# Patient Record
Sex: FEMALE | Race: WHITE | NOT HISPANIC OR LATINO | Employment: OTHER | URBAN - METROPOLITAN AREA
[De-identification: names, ages, dates, MRNs, and addresses within clinical notes are randomized per-mention and may not be internally consistent; named-entity substitution may affect disease eponyms.]

---

## 2017-03-09 ENCOUNTER — HOSPITAL ENCOUNTER (OUTPATIENT)
Dept: OTHER | Facility: HOSPITAL | Age: 77
Discharge: HOME | End: 2017-03-09
Attending: INTERNAL MEDICINE | Admitting: INTERNAL MEDICINE

## 2018-01-07 ENCOUNTER — LAB REQUISITION (OUTPATIENT)
Dept: LAB | Facility: HOSPITAL | Age: 78
End: 2018-01-07

## 2018-01-07 DIAGNOSIS — Z00.00 ROUTINE GENERAL MEDICAL EXAMINATION AT A HEALTH CARE FACILITY: ICD-10-CM

## 2018-01-07 LAB
BACTERIA UR QL AUTO: ABNORMAL /HPF
BILIRUB UR QL STRIP: NEGATIVE
CLARITY UR: ABNORMAL
COD CRY URNS QL: ABNORMAL /HPF
COLOR UR: ABNORMAL
GLUCOSE UR STRIP-MCNC: NEGATIVE MG/DL
HGB UR QL STRIP.AUTO: ABNORMAL
HYALINE CASTS UR QL AUTO: ABNORMAL /LPF
KETONES UR QL STRIP: NEGATIVE
LEUKOCYTE ESTERASE UR QL STRIP.AUTO: ABNORMAL
NITRITE UR QL STRIP: NEGATIVE
PH UR STRIP.AUTO: 6 [PH] (ref 5–8)
PROT UR QL STRIP: ABNORMAL
RBC # UR: ABNORMAL /HPF
REF LAB TEST METHOD: ABNORMAL
SP GR UR STRIP: 1.01 (ref 1–1.03)
SQUAMOUS #/AREA URNS HPF: ABNORMAL /HPF
UROBILINOGEN UR QL STRIP: ABNORMAL
WBC UR QL AUTO: ABNORMAL /HPF

## 2018-01-07 PROCEDURE — 81001 URINALYSIS AUTO W/SCOPE: CPT

## 2018-05-17 ENCOUNTER — HOSPITAL ENCOUNTER (OUTPATIENT)
Dept: GENERAL RADIOLOGY | Facility: HOSPITAL | Age: 78
Discharge: HOME | End: 2018-05-17
Attending: ORTHOPAEDIC SURGERY | Admitting: ORTHOPAEDIC SURGERY

## 2019-08-29 ENCOUNTER — OFFICE (OUTPATIENT)
Dept: URBAN - METROPOLITAN AREA CLINIC 64 | Facility: CLINIC | Age: 79
End: 2019-08-29

## 2019-08-29 ENCOUNTER — APPOINTMENT (OUTPATIENT)
Dept: GENERAL RADIOLOGY | Facility: HOSPITAL | Age: 79
End: 2019-08-29

## 2019-08-29 VITALS
WEIGHT: 170 LBS | HEIGHT: 63 IN | SYSTOLIC BLOOD PRESSURE: 138 MMHG | DIASTOLIC BLOOD PRESSURE: 52 MMHG | HEART RATE: 78 BPM

## 2019-08-29 DIAGNOSIS — K64.8 OTHER HEMORRHOIDS: ICD-10-CM

## 2019-08-29 DIAGNOSIS — K64.4 RESIDUAL HEMORRHOIDAL SKIN TAGS: ICD-10-CM

## 2019-08-29 DIAGNOSIS — K62.5 HEMORRHAGE OF ANUS AND RECTUM: ICD-10-CM

## 2019-08-29 DIAGNOSIS — K59.00 CONSTIPATION, UNSPECIFIED: ICD-10-CM

## 2019-08-29 PROCEDURE — 99203 OFFICE O/P NEW LOW 30 MIN: CPT | Performed by: NURSE PRACTITIONER

## 2019-08-29 RX ORDER — HYDROCORTISONE ACETATE 25 MG/1
25 SUPPOSITORY RECTAL
Qty: 14 | Refills: 1 | Status: COMPLETED
Start: 2019-08-29 | End: 2021-04-07

## 2019-08-29 RX ORDER — HYDROCORTISONE 25 MG/G
5 CREAM TOPICAL
Qty: 1 | Refills: 1 | Status: COMPLETED
Start: 2019-08-29 | End: 2021-04-07

## 2019-10-31 ENCOUNTER — OFFICE (OUTPATIENT)
Dept: URBAN - METROPOLITAN AREA CLINIC 64 | Facility: CLINIC | Age: 79
End: 2019-10-31

## 2019-10-31 VITALS
DIASTOLIC BLOOD PRESSURE: 85 MMHG | WEIGHT: 174 LBS | HEIGHT: 63 IN | HEART RATE: 68 BPM | SYSTOLIC BLOOD PRESSURE: 175 MMHG

## 2019-10-31 DIAGNOSIS — K59.00 CONSTIPATION, UNSPECIFIED: ICD-10-CM

## 2019-10-31 DIAGNOSIS — K62.5 HEMORRHAGE OF ANUS AND RECTUM: ICD-10-CM

## 2019-10-31 PROCEDURE — 99213 OFFICE O/P EST LOW 20 MIN: CPT | Performed by: NURSE PRACTITIONER

## 2020-02-04 ENCOUNTER — OFFICE VISIT (OUTPATIENT)
Dept: FAMILY MEDICINE CLINIC | Facility: CLINIC | Age: 80
End: 2020-02-04

## 2020-02-04 ENCOUNTER — TELEPHONE (OUTPATIENT)
Dept: FAMILY MEDICINE CLINIC | Facility: CLINIC | Age: 80
End: 2020-02-04

## 2020-02-04 VITALS
OXYGEN SATURATION: 96 % | RESPIRATION RATE: 16 BRPM | BODY MASS INDEX: 32.89 KG/M2 | HEIGHT: 63 IN | HEART RATE: 89 BPM | TEMPERATURE: 99 F | WEIGHT: 185.6 LBS

## 2020-02-04 DIAGNOSIS — I10 ESSENTIAL HYPERTENSION: ICD-10-CM

## 2020-02-04 DIAGNOSIS — E78.2 MIXED HYPERLIPIDEMIA: ICD-10-CM

## 2020-02-04 DIAGNOSIS — N18.4 CHRONIC KIDNEY DISEASE, STAGE IV (SEVERE) (HCC): ICD-10-CM

## 2020-02-04 DIAGNOSIS — H61.22 IMPACTED CERUMEN OF LEFT EAR: ICD-10-CM

## 2020-02-04 DIAGNOSIS — E03.9 ACQUIRED HYPOTHYROIDISM: Primary | ICD-10-CM

## 2020-02-04 DIAGNOSIS — S72.009D CLOSED FRACTURE OF NECK OF FEMUR WITH ROUTINE HEALING, UNSPECIFIED LATERALITY, SUBSEQUENT ENCOUNTER: ICD-10-CM

## 2020-02-04 PROBLEM — N18.9 ANEMIA SECONDARY TO RENAL FAILURE: Status: ACTIVE | Noted: 2020-02-04

## 2020-02-04 PROBLEM — G93.41 METABOLIC ENCEPHALOPATHY: Status: ACTIVE | Noted: 2018-07-06

## 2020-02-04 PROBLEM — IMO0002 DEGENERATION OF INTERVERTEBRAL DISC: Status: ACTIVE | Noted: 2020-02-04

## 2020-02-04 PROBLEM — J30.1 HAY FEVER: Status: ACTIVE | Noted: 2020-02-04

## 2020-02-04 PROBLEM — E07.89: Status: ACTIVE | Noted: 2018-07-07

## 2020-02-04 PROBLEM — Z12.31 VISIT FOR SCREENING MAMMOGRAM: Status: ACTIVE | Noted: 2020-02-04

## 2020-02-04 PROBLEM — S72.009A FRACTURE OF NECK OF FEMUR (HCC): Status: ACTIVE | Noted: 2020-02-04

## 2020-02-04 PROBLEM — E78.5 HYPERLIPIDEMIA: Status: ACTIVE | Noted: 2020-02-04

## 2020-02-04 PROBLEM — M19.90 OSTEOARTHRITIS: Status: ACTIVE | Noted: 2020-02-04

## 2020-02-04 PROBLEM — G47.00 INSOMNIA, UNSPECIFIED: Status: ACTIVE | Noted: 2020-02-04

## 2020-02-04 PROBLEM — E66.3 OVERWEIGHT: Status: ACTIVE | Noted: 2020-02-04

## 2020-02-04 PROBLEM — S72.001A CLOSED DISPLACED FRACTURE OF RIGHT FEMORAL NECK (HCC): Status: ACTIVE | Noted: 2017-10-21

## 2020-02-04 PROBLEM — I34.0 MITRAL REGURGITATION: Status: ACTIVE | Noted: 2020-02-04

## 2020-02-04 PROBLEM — D63.1 ANEMIA SECONDARY TO RENAL FAILURE: Status: ACTIVE | Noted: 2020-02-04

## 2020-02-04 PROBLEM — R79.89 T3 LOW IN SERUM: Status: ACTIVE | Noted: 2018-07-07

## 2020-02-04 PROCEDURE — 99203 OFFICE O/P NEW LOW 30 MIN: CPT | Performed by: FAMILY MEDICINE

## 2020-02-04 PROCEDURE — 69209 REMOVE IMPACTED EAR WAX UNI: CPT | Performed by: FAMILY MEDICINE

## 2020-02-04 RX ORDER — NEPHROCAP 1 MG
CAP ORAL
COMMUNITY
Start: 2015-04-14

## 2020-02-04 RX ORDER — DILTIAZEM HYDROCHLORIDE 240 MG/1
CAPSULE, COATED, EXTENDED RELEASE ORAL
COMMUNITY
Start: 2012-07-12

## 2020-02-04 RX ORDER — QUETIAPINE FUMARATE 25 MG/1
12.5 TABLET, FILM COATED ORAL
COMMUNITY
Start: 2018-07-19

## 2020-02-04 RX ORDER — SULFAMETHOXAZOLE AND TRIMETHOPRIM 800; 160 MG/1; MG/1
TABLET ORAL
COMMUNITY
Start: 2014-03-19

## 2020-02-04 RX ORDER — NEBIVOLOL 5 MG/1
TABLET ORAL
COMMUNITY
Start: 2011-09-06

## 2020-02-04 RX ORDER — AMIODARONE HYDROCHLORIDE 200 MG/1
TABLET ORAL
COMMUNITY
Start: 2020-01-14

## 2020-02-04 RX ORDER — ACETAMINOPHEN 325 MG/1
TABLET ORAL
COMMUNITY
Start: 2020-01-16

## 2020-02-04 RX ORDER — DIPHENHYDRAMINE HYDROCHLORIDE 25 MG/1
CAPSULE ORAL
COMMUNITY
Start: 2020-01-02

## 2020-02-04 RX ORDER — SERTRALINE HYDROCHLORIDE 25 MG/1
TABLET, FILM COATED ORAL
COMMUNITY
Start: 2013-04-01

## 2020-02-04 RX ORDER — ANTACID TABLETS 500 MG/1
TABLET, CHEWABLE ORAL
COMMUNITY
Start: 2019-12-29

## 2020-02-04 RX ORDER — BUMETANIDE 2 MG/1
TABLET ORAL
COMMUNITY
Start: 2020-01-17

## 2020-02-04 RX ORDER — SUCRALFATE 1 G/1
TABLET ORAL
COMMUNITY
Start: 2014-07-07

## 2020-02-04 RX ORDER — POTASSIUM CHLORIDE 1.5 G/1.77G
POWDER, FOR SOLUTION ORAL
COMMUNITY
Start: 2014-07-07

## 2020-02-04 RX ORDER — POLYETHYLENE GLYCOL 3350 17 G/17G
POWDER ORAL
COMMUNITY
Start: 2019-12-27

## 2020-02-04 RX ORDER — LANOLIN ALCOHOL/MO/W.PET/CERES
CREAM (GRAM) TOPICAL
COMMUNITY
Start: 2020-01-10

## 2020-02-04 RX ORDER — IPRATROPIUM BROMIDE AND ALBUTEROL SULFATE 2.5; .5 MG/3ML; MG/3ML
SOLUTION RESPIRATORY (INHALATION)
COMMUNITY
Start: 2019-11-07

## 2020-02-04 RX ORDER — MIDODRINE HYDROCHLORIDE 10 MG/1
TABLET ORAL
COMMUNITY
Start: 2020-01-31

## 2020-02-04 RX ORDER — PROMETHAZINE HYDROCHLORIDE 25 MG/1
TABLET ORAL
COMMUNITY
Start: 2013-12-27

## 2020-02-04 RX ORDER — HYDRALAZINE HYDROCHLORIDE 50 MG/1
50 TABLET, FILM COATED ORAL
COMMUNITY

## 2020-02-04 RX ORDER — NEBIVOLOL 10 MG/1
TABLET ORAL
COMMUNITY
Start: 2012-07-12

## 2020-02-04 RX ORDER — FOLIC ACID 1 MG/1
TABLET ORAL
COMMUNITY
Start: 2020-01-09

## 2020-02-04 RX ORDER — ASCORBIC ACID 500 MG
TABLET ORAL
COMMUNITY
Start: 2012-07-12

## 2020-02-04 RX ORDER — MULTIVITAMIN WITH IRON
TABLET ORAL
COMMUNITY
Start: 2011-09-07

## 2020-02-04 RX ORDER — RENAGEL 800 MG/1
TABLET ORAL
COMMUNITY
Start: 2019-12-23

## 2020-02-04 RX ORDER — MULTIVITAMIN
CAPSULE ORAL
COMMUNITY
Start: 2013-04-01

## 2020-02-04 RX ORDER — LEVOTHYROXINE SODIUM 0.15 MG/1
TABLET ORAL
COMMUNITY
Start: 2020-01-20

## 2020-02-04 RX ORDER — FERROUS SULFATE 325(65) MG
TABLET ORAL
COMMUNITY
Start: 2012-12-17

## 2020-02-04 RX ORDER — MIDODRINE HYDROCHLORIDE 5 MG/1
TABLET ORAL
COMMUNITY
Start: 2020-01-11

## 2020-02-04 RX ORDER — SEVELAMER CARBONATE 800 MG/1
TABLET, FILM COATED ORAL
COMMUNITY
Start: 2020-01-19

## 2020-02-04 RX ORDER — ALPRAZOLAM 0.5 MG/1
TABLET ORAL
COMMUNITY
Start: 2014-01-20

## 2020-02-04 RX ORDER — MIRTAZAPINE 30 MG/1
TABLET, FILM COATED ORAL
COMMUNITY
Start: 2020-01-25

## 2020-02-04 RX ORDER — HYDRALAZINE HYDROCHLORIDE 25 MG/1
3 TABLET, FILM COATED ORAL
COMMUNITY
Start: 2011-12-08 | End: 2020-05-12 | Stop reason: SDUPTHER

## 2020-02-04 RX ORDER — PANTOPRAZOLE SODIUM 40 MG/1
TABLET, DELAYED RELEASE ORAL
COMMUNITY
Start: 2020-01-05

## 2020-02-04 RX ORDER — ESCITALOPRAM OXALATE 10 MG/1
TABLET ORAL
COMMUNITY
Start: 2020-01-10

## 2020-02-04 RX ORDER — SIMVASTATIN 10 MG
TABLET ORAL
COMMUNITY
Start: 2020-01-09

## 2020-02-04 NOTE — TELEPHONE ENCOUNTER
Called [facility] and rehab and spoke to edison and gave her orders  For cephalexin 500mg tid for 10 days and liquid colace 5 drops each ear nightly with no stop date at this time. Edison did repeat those orders back to me.

## 2020-02-04 NOTE — PROGRESS NOTES
Subjective   Vidhi Bertrand is a 79 y.o. female.     Chief Complaint   Patient presents with   • Hypothyroidism   • Arm Pain   • Fall       Patient is currently in rehab at [facility] due to a fall from 2 years ago which resulted in a broken right shoulder and right leg. She was brought here today by her daughter who did not accompany her into the room.  has an extensive medical history including kidney failure and she gets dyalisis at [facility], hyperlipidemia, hypothyroidism, and anemia. We have requested Mrs.Jobes CASTLE from [facility].    Hyperthyroidism   This is a chronic problem. The current episode started more than 1 year ago. The problem occurs constantly. Associated symptoms include congestion, fatigue, nausea and a visual change. Pertinent negatives include no abdominal pain, chest pain, chills, coughing, diaphoresis, fever, headaches, sore throat or vomiting. Nothing aggravates the symptoms. She has tried nothing for the symptoms. The treatment provided no relief.   Arm Pain    The incident occurred more than 1 week ago. The injury mechanism was a fall. The pain is present in the right elbow, right forearm and right shoulder. The quality of the pain is described as aching. The pain does not radiate. The pain is mild. Pertinent negatives include no chest pain.            I personally reviewed and updated the patient's allergies, medications, problem list, and past medical, surgical, social, and family history.     Family History   Problem Relation Age of Onset   • Stomach cancer Mother    • Stomach cancer Father    • Kidney cancer Sister    • Stroke Brother    • Cerebral aneurysm Brother    • Kidney cancer Sister        Social History     Tobacco Use   • Smoking status: Never Smoker   • Smokeless tobacco: Never Used   Substance Use Topics   • Alcohol use: Not Currently   • Drug use: Not Currently       Past Surgical History:   Procedure Laterality Date   • CARDIAC  CATHETERIZATION  09/2006    Negative, Stavens. Stavens 8/2009   • CARPAL TUNNEL RELEASE Left     LUE   • CEREBRAL ANEURYSM REPAIR  2003   • COLONOSCOPY  09/2007    Diverticulosis, and sm internal hemorrhoid, Saranya. Normal 2002   • REPLACEMENT TOTAL KNEE Right 2006    Left knee 06/05/2008 Dr. Hernandez   • TOTAL ABDOMINAL HYSTERECTOMY WITH SALPINGO OOPHORECTOMY         Patient Active Problem List   Diagnosis   • Paroxysmal atrial fibrillation (CMS/HCC)   • Anemia   • Anxiety   • Chronic kidney disease, stage IV (severe) (CMS/HCC)   • Closed displaced fracture of right femoral neck (CMS/HCC)   • Fracture of neck of femur (CMS/HCC)   • Degeneration of intervertebral disc   • Visit for screening mammogram   • Anemia secondary to renal failure   • End-stage renal disease (CMS/HCC)   • Focal glomerulosclerosis   • Hand paresthesia   • Hay fever   • Hyperlipidemia   • Hypertension   • Insomnia, unspecified   • Memory loss   • Intracranial aneurysm   • Metabolic encephalopathy   • Mitral regurgitation   • Hypothyroidism   • Myxedema   • Osteoarthritis   • Osteopenia   • Overweight   • Pleural effusion, not elsewhere classified   • Postmenopausal status   • Secondary hyperparathyroidism (CMS/HCC)   • T3 low in serum   • Thyroid hormone resistance syndrome         Current Outpatient Medications:   •  acetaminophen (TYLENOL) 325 MG tablet, , Disp: , Rfl:   •  ALPRAZolam (XANAX) 0.5 MG tablet, Take  by mouth., Disp: , Rfl:   •  amiodarone (PACERONE) 200 MG tablet, , Disp: , Rfl:   •  B Complex-C-Folic Acid (VIRT-CAPS) 1 MG capsule, VIRT-CAPS 1 MG CAPS, Disp: , Rfl:   •  BANOPHEN 25 MG capsule, , Disp: , Rfl:   •  bumetanide (BUMEX) 2 MG tablet, , Disp: , Rfl:   •  ANTONI-GEST ANTACID 500 MG chewable tablet, , Disp: , Rfl:   •  diclofenac (VOLTAREN) 1 % gel gel, , Disp: , Rfl:   •  dilTIAZem CD (CARTIA XT) 240 MG 24 hr capsule, Take  by mouth., Disp: , Rfl:   •  epoetin luisa (PROCRIT) 89709 UNIT/ML injection, PROCRIT SOLN, Disp: ,  Rfl:   •  escitalopram (LEXAPRO) 10 MG tablet, , Disp: , Rfl:   •  ferrous sulfate 325 (65 FE) MG tablet, Take  by mouth., Disp: , Rfl:   •  folic acid (FOLVITE) 1 MG tablet, , Disp: , Rfl:   •  hydrALAZINE (APRESOLINE) 25 MG tablet, Take 3 tablets by mouth., Disp: , Rfl:   •  hydrALAZINE (APRESOLINE) 50 MG tablet, Take 50 mg by mouth., Disp: , Rfl:   •  ipratropium-albuterol (DUO-NEB) 0.5-2.5 mg/3 ml nebulizer, , Disp: , Rfl:   •  levothyroxine (SYNTHROID, LEVOTHROID) 150 MCG tablet, , Disp: , Rfl:   •  melatonin 3 MG tablet, , Disp: , Rfl:   •  midodrine (PROAMATINE) 10 MG tablet, , Disp: , Rfl:   •  midodrine (PROAMATINE) 5 MG tablet, , Disp: , Rfl:   •  mirtazapine (REMERON) 30 MG tablet, , Disp: , Rfl:   •  Multiple Vitamin (MULTIVITAMIN) capsule, Take  by mouth., Disp: , Rfl:   •  nebivolol (BYSTOLIC) 10 MG tablet, Take  by mouth., Disp: , Rfl:   •  nebivolol (BYSTOLIC) 5 MG tablet, BYSTOLIC 5 MG TABS, Disp: , Rfl:   •  pantoprazole (PROTONIX) 40 MG EC tablet, , Disp: , Rfl:   •  Polyethylene Glycol 3350 (PEG 3350) powder, , Disp: , Rfl:   •  potassium chloride (KLOR-CON) 20 MEQ packet, Take  by mouth., Disp: , Rfl:   •  promethazine (PHENERGAN) 25 MG tablet, PROMETHAZINE HCL 25 MG TABS, Disp: , Rfl:   •  QUEtiapine (SEROquel) 25 MG tablet, Take 12.5 mg by mouth., Disp: , Rfl:   •  RENAGEL 800 MG tablet, , Disp: , Rfl:   •  sertraline (ZOLOFT) 25 MG tablet, Take  by mouth., Disp: , Rfl:   •  sevelamer (RENVELA) 800 MG tablet, , Disp: , Rfl:   •  simvastatin (ZOCOR) 10 MG tablet, , Disp: , Rfl:   •  sucralfate (CARAFATE) 1 g tablet, Take  by mouth., Disp: , Rfl:   •  sulfamethoxazole-trimethoprim (BACTRIM DS,SEPTRA DS) 800-160 MG per tablet, Take  by mouth., Disp: , Rfl:   •  vitamin C (ASCORBIC ACID) 500 MG tablet, Take  by mouth., Disp: , Rfl:   •  Vitamins A & D (VITAMIN A & D) 8000-400 units capsule, VITAMIN D 800 IU CAPSULE, Disp: , Rfl:          Review of Systems   Constitutional: Positive for fatigue.  "Negative for chills, diaphoresis and fever.   HENT: Positive for congestion. Negative for sore throat.    Eyes: Negative for visual disturbance.   Respiratory: Negative for cough and shortness of breath.    Cardiovascular: Negative for chest pain and palpitations.   Gastrointestinal: Positive for nausea. Negative for abdominal pain and vomiting.   Endocrine: Negative for polydipsia and polyphagia.   Musculoskeletal: Negative for neck stiffness.   Skin: Negative for color change and pallor.   Neurological: Negative for seizures and syncope.   Hematological: Negative for adenopathy.       Objective   Pulse 89   Temp 99 °F (37.2 °C)   Resp 16   Ht 160 cm (63\")   Wt 84.2 kg (185 lb 9.6 oz)   SpO2 96%   Breastfeeding No   BMI 32.88 kg/m²   Wt Readings from Last 3 Encounters:   02/04/20 84.2 kg (185 lb 9.6 oz)     Physical Exam   Constitutional: She is oriented to person, place, and time. She appears well-developed and well-nourished.   HENT:   Right Ear: Hearing, tympanic membrane, external ear and ear canal normal.   Left Ear: Hearing, tympanic membrane, external ear and ear canal normal.   Nose: Nose normal. No mucosal edema, sinus tenderness or congestion. Right sinus exhibits no maxillary sinus tenderness and no frontal sinus tenderness. Left sinus exhibits no maxillary sinus tenderness and no frontal sinus tenderness.   Mouth/Throat: Uvula is midline, oropharynx is clear and moist and mucous membranes are normal. No oral lesions. No oropharyngeal exudate or posterior oropharyngeal erythema. No tonsillar exudate.   Cardiovascular: Normal rate, regular rhythm, S1 normal, S2 normal, normal heart sounds, intact distal pulses and normal pulses. Exam reveals no gallop and no friction rub.   No murmur heard.  Pulmonary/Chest: Effort normal and breath sounds normal. No accessory muscle usage or stridor. She has no decreased breath sounds. She has no wheezes. She has no rhonchi. She has no rales.   Abdominal: Soft. " Normal appearance, normal aorta and bowel sounds are normal. She exhibits no distension, no pulsatile midline mass and no mass. There is no hepatosplenomegaly. There is no tenderness. There is no rigidity, no rebound, no guarding, no CVA tenderness and negative Martel's sign. No hernia.   Neurological: She is alert and oriented to person, place, and time. Coordination and gait normal.   Skin: Skin is warm and dry. Turgor is normal. She is not diaphoretic. No pallor.         Assessment/Plan       Acute bacterial sinusitis.  Start antibiotics.  Follow-up recheck.  Cerumen impaction.  Cleared with irrigation.  Renal failure.  On intermittent dialysis.  Secondary to hypertension.  Followed by Anuradha basilio.  Stable on amiodarone.  Followed by Dr. Vo.  Will get records.  Hypothyroidism.  Clinically stable.  Will get records/plan repeat blood work.  Hyperlipidemia.  Stable on statin.  Will get records.  Hip fracture.  Status post replacement.  Has done well with PT, consider restart.  Depression.  Doing well on Lexapro.    Problem List Items Addressed This Visit        Unprioritized    Chronic kidney disease, stage IV (severe) (CMS/HCC)    Relevant Medications    bumetanide (BUMEX) 2 MG tablet    Fracture of neck of femur (CMS/HCC)    Hyperlipidemia    Relevant Medications    simvastatin (ZOCOR) 10 MG tablet    Hypertension    Relevant Medications    midodrine (PROAMATINE) 10 MG tablet    midodrine (PROAMATINE) 5 MG tablet    bumetanide (BUMEX) 2 MG tablet    dilTIAZem CD (CARTIA XT) 240 MG 24 hr capsule    hydrALAZINE (APRESOLINE) 25 MG tablet    hydrALAZINE (APRESOLINE) 50 MG tablet    nebivolol (BYSTOLIC) 10 MG tablet    nebivolol (BYSTOLIC) 5 MG tablet    Hypothyroidism - Primary    Relevant Medications    levothyroxine (SYNTHROID, LEVOTHROID) 150 MCG tablet    nebivolol (BYSTOLIC) 10 MG tablet    nebivolol (BYSTOLIC) 5 MG tablet      Other Visit Diagnoses     Impacted cerumen of left ear                   Expected course, medications, and adverse effects discussed.  Call or return if worsening or persistent symptoms.

## 2020-02-09 ENCOUNTER — TELEPHONE (OUTPATIENT)
Dept: FAMILY MEDICINE CLINIC | Facility: CLINIC | Age: 80
End: 2020-02-09

## 2020-02-10 ENCOUNTER — TELEPHONE (OUTPATIENT)
Dept: FAMILY MEDICINE CLINIC | Facility: CLINIC | Age: 80
End: 2020-02-10

## 2020-02-10 NOTE — TELEPHONE ENCOUNTER
Spoke to carolina at [facility] and asked to have mar and last labs from dialysis faxed over. Also gave her orders for additonal blood work noted in separate message.

## 2020-02-10 NOTE — TELEPHONE ENCOUNTER
----- Message from Hernandez Booker MD sent at 2/9/2020  9:03 AM EST -----  I was able to review her labs from this fall, [facility] plus free T4 plus B12 and iron panel and vit d ahead of her upcoming visit, thanks

## 2020-02-10 NOTE — TELEPHONE ENCOUNTER
I spoke to carolina and asked to have her mar faxed again. I asked also for her last labs from dialysis faxed over. I asked her to please draw a fasting lipid, cbc,cmp,tsh,t4 free, iron, folate, ferritin, vit b12, and vit  D. Carolina repeated these orders back to me.

## 2020-02-12 ENCOUNTER — TELEPHONE (OUTPATIENT)
Dept: FAMILY MEDICINE CLINIC | Facility: CLINIC | Age: 80
End: 2020-02-12

## 2020-02-12 NOTE — TELEPHONE ENCOUNTER
Called and spoke to teodora wilson nurse for [facility]rs luly tonight at 6:47 on 2/12/2020 I let her know dr yang wants a crp,lipase,cmp,cbc, white cell count of stool, stool culture, and a oandp. Teodora repeated these orders back to me.

## 2020-02-12 NOTE — TELEPHONE ENCOUNTER
[facility] called and stated the antibiotic that Vidhi was started on is now causing bloody diarrhea. Dialysis is getting a c-diff sample, they wanted to make sure you didn't want anything else ordered. [phone]

## 2020-02-18 ENCOUNTER — TELEPHONE (OUTPATIENT)
Dept: FAMILY MEDICINE CLINIC | Facility: CLINIC | Age: 80
End: 2020-02-18

## 2020-02-18 NOTE — TELEPHONE ENCOUNTER
----- Message from Hernandez Booker MD sent at 2/17/2020 10:29 AM EST -----  Call the nurse at [facility] and rehab and check on her, see if she is still having bloody diarrhea or if she is improving, will test for C. difficile colitis was negative, her blood count is just mildly low/stable from the last check, she has some white blood cells in her stool so she may have some infection there, we will plan to wait for the culture results to see if she needs further treatment, thanks

## 2020-02-18 NOTE — TELEPHONE ENCOUNTER
Spoke to carolina the unit nurse and let her know imodium every 4 hrs prn for loose stools. She repeated order back.

## 2020-02-18 NOTE — TELEPHONE ENCOUNTER
[facility] sent a fax on 2/15/2020 asking for an order for imodium for loose stools. Are you ok with this?

## 2020-02-18 NOTE — TELEPHONE ENCOUNTER
Called and spoke to Vivian. She stated Vidhi seems to be improving but the culture results are still pending. As soon as she gets those results she will fax them to us. [phone]

## 2020-04-23 ENCOUNTER — TELEMEDICINE (OUTPATIENT)
Dept: FAMILY MEDICINE CLINIC | Facility: CLINIC | Age: 80
End: 2020-04-23

## 2020-04-23 DIAGNOSIS — I48.0 PAROXYSMAL ATRIAL FIBRILLATION (HCC): Primary | ICD-10-CM

## 2020-04-23 DIAGNOSIS — N18.4 CHRONIC KIDNEY DISEASE, STAGE IV (SEVERE) (HCC): ICD-10-CM

## 2020-04-23 DIAGNOSIS — S72.001A CLOSED DISPLACED FRACTURE OF RIGHT FEMORAL NECK (HCC): ICD-10-CM

## 2020-04-23 DIAGNOSIS — U07.1 COVID-19 VIRUS DETECTED: ICD-10-CM

## 2020-04-23 DIAGNOSIS — F41.9 ANXIETY: ICD-10-CM

## 2020-04-23 PROCEDURE — 99309 SBSQ NF CARE MODERATE MDM 30: CPT | Performed by: FAMILY MEDICINE

## 2020-04-23 PROCEDURE — 99213 OFFICE O/P EST LOW 20 MIN: CPT | Performed by: FAMILY MEDICINE

## 2020-04-24 VITALS
HEART RATE: 64 BPM | TEMPERATURE: 98.2 F | RESPIRATION RATE: 20 BRPM | DIASTOLIC BLOOD PRESSURE: 68 MMHG | WEIGHT: 183 LBS | BODY MASS INDEX: 32.42 KG/M2 | OXYGEN SATURATION: 97 % | SYSTOLIC BLOOD PRESSURE: 122 MMHG

## 2020-04-24 PROBLEM — U07.1 COVID-19 VIRUS DETECTED: Status: ACTIVE | Noted: 2020-04-24

## 2020-04-24 NOTE — PROGRESS NOTES
Nursing Home History and Physical Note      Ivan Klein MD  []  Hortencia Cristobal MD []  Leo Ruano MD []  Hernandez Booker MD [x]  Juliet Lees MD []  Clarke Stokes Sr, MD []   [facility]  [address]  [place] [address]  Phone: [phone]  Fax: [phone]       PATIENT NAME: Vidhi Bertrand                                                                          YOB: 1940            DATE OF SERVICE: 04/23/2020    FACILITY:   [] [facility]    [] [facility]   [x] [facility]               [] [facility]    [] Other ______________________________________________________________________     Vidhi Bertrand is a 79 y.o. female.     CHIEF COMPLAINT:   Chief Complaint   Patient presents with   • Atrial Fibrillation         HISTORY OF PRESENT ILLNESS:    Vidhi was evaluated today for nursing home visit.  She is feeling well with minor complaints.  Medications and lab service results reviewed.    Atrial Fibrillation   Presents for follow-up visit. Symptoms are negative for chest pain, dizziness, hemodynamic instability, palpitations, shortness of breath and syncope. The symptoms have been stable. Past medical history includes atrial fibrillation.   Sore Throat    This is a new problem. The current episode started in the past 7 days. The problem has been unchanged. Neither side of throat is experiencing more pain than the other. There has been no fever. Associated symptoms include congestion. Pertinent negatives include no abdominal pain, coughing or shortness of breath.       PAST MEDICAL HISTORY:   Patient Active Problem List   Diagnosis   • Paroxysmal atrial fibrillation (CMS/HCC)   • Anemia   • Anxiety   • Chronic kidney disease, stage IV (severe) (CMS/HCC)   • Closed displaced fracture of right femoral neck (CMS/HCC)   • Fracture of neck of femur (CMS/HCC)   • Degeneration of intervertebral disc   • Visit for screening mammogram   • Anemia secondary to  renal failure   • End-stage renal disease (CMS/HCC)   • Focal glomerulosclerosis   • Hand paresthesia   • Hay fever   • Hyperlipidemia   • Hypertension   • Insomnia, unspecified   • Memory loss   • Intracranial aneurysm   • Metabolic encephalopathy   • Mitral regurgitation   • Hypothyroidism   • Myxedema   • Osteoarthritis   • Osteopenia   • Overweight   • Pleural effusion, not elsewhere classified   • Postmenopausal status   • Secondary hyperparathyroidism (CMS/HCC)   • T3 low in serum   • Thyroid hormone resistance syndrome   • COVID-19 virus detected       SURGICAL HISTORY:  Past Surgical History:   Procedure Laterality Date   • CARDIAC CATHETERIZATION  09/2006    Negative, Stavens. Stavens 8/2009   • CARPAL TUNNEL RELEASE Left     LUE   • CEREBRAL ANEURYSM REPAIR  2003   • COLONOSCOPY  09/2007    Diverticulosis, and sm internal hemorrhoid, Saranya. Normal 2002   • REPLACEMENT TOTAL KNEE Right 2006    Left knee 06/05/2008 Dr. Hernandez   • TOTAL ABDOMINAL HYSTERECTOMY WITH SALPINGO OOPHORECTOMY         MEDICATIONS:  Current Outpatient Medications on File Prior to Visit   Medication Sig Dispense Refill   • acetaminophen (TYLENOL) 325 MG tablet      • ALPRAZolam (XANAX) 0.5 MG tablet Take  by mouth.     • amiodarone (PACERONE) 200 MG tablet      • B Complex-C-Folic Acid (VIRT-CAPS) 1 MG capsule VIRT-CAPS 1 MG CAPS     • BANOPHEN 25 MG capsule      • bumetanide (BUMEX) 2 MG tablet      • ANTONI-GEST ANTACID 500 MG chewable tablet      • diclofenac (VOLTAREN) 1 % gel gel      • dilTIAZem CD (CARTIA XT) 240 MG 24 hr capsule Take  by mouth.     • epoetin luisa (PROCRIT) 03472 UNIT/ML injection PROCRIT SOLN     • escitalopram (LEXAPRO) 10 MG tablet      • ferrous sulfate 325 (65 FE) MG tablet Take  by mouth.     • folic acid (FOLVITE) 1 MG tablet      • hydrALAZINE (APRESOLINE) 25 MG tablet Take 3 tablets by mouth.     • hydrALAZINE (APRESOLINE) 50 MG tablet Take 50 mg by mouth.     • ipratropium-albuterol (DUO-NEB) 0.5-2.5  mg/3 ml nebulizer      • levothyroxine (SYNTHROID, LEVOTHROID) 150 MCG tablet      • melatonin 3 MG tablet      • midodrine (PROAMATINE) 10 MG tablet      • midodrine (PROAMATINE) 5 MG tablet      • mirtazapine (REMERON) 30 MG tablet      • Multiple Vitamin (MULTIVITAMIN) capsule Take  by mouth.     • nebivolol (BYSTOLIC) 10 MG tablet Take  by mouth.     • nebivolol (BYSTOLIC) 5 MG tablet BYSTOLIC 5 MG TABS     • pantoprazole (PROTONIX) 40 MG EC tablet      • Polyethylene Glycol 3350 (PEG 3350) powder      • potassium chloride (KLOR-CON) 20 MEQ packet Take  by mouth.     • promethazine (PHENERGAN) 25 MG tablet PROMETHAZINE HCL 25 MG TABS     • QUEtiapine (SEROquel) 25 MG tablet Take 12.5 mg by mouth.     • RENAGEL 800 MG tablet      • sertraline (ZOLOFT) 25 MG tablet Take  by mouth.     • sevelamer (RENVELA) 800 MG tablet      • simvastatin (ZOCOR) 10 MG tablet      • sucralfate (CARAFATE) 1 g tablet Take  by mouth.     • sulfamethoxazole-trimethoprim (BACTRIM DS,SEPTRA DS) 800-160 MG per tablet Take  by mouth.     • vitamin C (ASCORBIC ACID) 500 MG tablet Take  by mouth.     • Vitamins A & D (VITAMIN A & D) 8000-400 units capsule VITAMIN D 800 IU CAPSULE       No current facility-administered medications on file prior to visit.        I have reviewed and reconciled the patients medication list in the patients chart at the skilled nursing facility today.      ALLERGIES:  No Known Allergies      SOCIAL HISTORY:  Social History     Socioeconomic History   • Marital status:      Spouse name: Not on file   • Number of children: Not on file   • Years of education: Not on file   • Highest education level: Not on file   Tobacco Use   • Smoking status: Never Smoker   • Smokeless tobacco: Never Used   Substance and Sexual Activity   • Alcohol use: Not Currently   • Drug use: Not Currently   • Sexual activity: Defer       FAMILY HISTORY:  Family History   Problem Relation Age of Onset   • Stomach cancer Mother    •  Stomach cancer Father    • Kidney cancer Sister    • Stroke Brother    • Cerebral aneurysm Brother    • Kidney cancer Sister        REVIEW OF SYSTEMS:  Review of Systems   Constitutional: Negative for chills and diaphoresis.   HENT: Positive for congestion and sore throat.    Eyes: Negative for visual disturbance.   Respiratory: Negative for cough and shortness of breath.    Cardiovascular: Negative for chest pain, palpitations and syncope.   Gastrointestinal: Negative for abdominal pain and nausea.   Endocrine: Negative for polydipsia and polyphagia.   Musculoskeletal: Negative for neck stiffness.   Skin: Negative for color change and pallor.   Neurological: Negative for dizziness, seizures and syncope.   Hematological: Negative for adenopathy.   Psychiatric/Behavioral: Negative for hallucinations and suicidal ideas.         VITAL SIGNS: /68   Pulse 64   Temp 98.2 °F (36.8 °C)   Resp 20   Wt 83 kg (183 lb)   SpO2 97%   BMI 32.42 kg/m²     PHYSICAL EXAMINATION:   Physical Exam   Constitutional: She appears well-developed and well-nourished.   Skin: Skin is intact.       RECORDS REVIEW:   I have reviewed and interpreted the following lab test results  obtained at the time of the visit today.    Vidhi consented to undergo a video visit today.  This format was necessitated by the current COVID-19 viral pandemic.     ASSESSMENT    Renal failure.  On intermittent dialysis.  Secondary to hypertension.  Followed by Anuradha GUERRERO fib.  Stable on amiodarone.  Followed by Dr. Vo.  Will get records.  Hypothyroidism.  Clinically stable.  Will get records/plan repeat blood work.  Hyperlipidemia.  Stable on statin.  Will get records.  Hip fracture.  Status post replacement.  Has done well with PT, consider restart.  Depression.  Doing well on Lexapro.  COVID-19 viral infection.  Overall doing well, positive sore throat, otherwise asymptomatic.  Start azithromycin.  Strict quarantine/isolation, monitor for  worsening symptoms.  Diarrhea.  Persistent.  Stool cultures/C. difficile toxin negative.  CBC normal.  Start azithromycin.  Add PRN Colestid.  Hypertension.  Good control.      Problem List Items Addressed This Visit        Unprioritized    Paroxysmal atrial fibrillation (CMS/HCC) - Primary    Anxiety    Chronic kidney disease, stage IV (severe) (CMS/HCC)    Closed displaced fracture of right femoral neck (CMS/HCC)    COVID-19 virus detected           PLAN  [x]  Discussed Patient in detail with nursing/staff, addressed all needs today.     [x]  Plan of Care Reviewed   [x]  PT/OT Reviewed   []  Order Changes  []  Discharge Plans Reviewed  []  Advance Directive on file with Nursing Home.   []  POA on file with Nursing Home.   []  Code Status listed: []  Full Code   []  DNR         Hernandez Booker MD    Nursing Home Codes: 53267 SUBSEQ DETAILED HX, DETAILED EXAM, MOD

## 2020-04-28 ENCOUNTER — TELEPHONE (OUTPATIENT)
Dept: FAMILY MEDICINE CLINIC | Facility: CLINIC | Age: 80
End: 2020-04-28

## 2020-04-28 NOTE — TELEPHONE ENCOUNTER
Angelita called from [facility] and said that they took Vidhi from dialysis to [facility] and have admitted her.

## 2020-05-04 ENCOUNTER — TELEPHONE (OUTPATIENT)
Dept: FAMILY MEDICINE CLINIC | Facility: CLINIC | Age: 80
End: 2020-05-04

## 2020-05-12 ENCOUNTER — TELEMEDICINE (OUTPATIENT)
Dept: FAMILY MEDICINE CLINIC | Facility: CLINIC | Age: 80
End: 2020-05-12

## 2020-05-12 ENCOUNTER — TELEPHONE (OUTPATIENT)
Dept: FAMILY MEDICINE CLINIC | Facility: CLINIC | Age: 80
End: 2020-05-12

## 2020-05-12 DIAGNOSIS — I10 ESSENTIAL HYPERTENSION: ICD-10-CM

## 2020-05-12 DIAGNOSIS — E78.2 MIXED HYPERLIPIDEMIA: ICD-10-CM

## 2020-05-12 DIAGNOSIS — I48.0 PAROXYSMAL ATRIAL FIBRILLATION (HCC): Primary | ICD-10-CM

## 2020-05-12 DIAGNOSIS — N18.4 CHRONIC KIDNEY DISEASE, STAGE IV (SEVERE) (HCC): ICD-10-CM

## 2020-05-12 DIAGNOSIS — E03.9 ACQUIRED HYPOTHYROIDISM: ICD-10-CM

## 2020-05-12 PROBLEM — A41.9 SEPSIS (HCC): Status: ACTIVE | Noted: 2020-04-28

## 2020-05-12 PROCEDURE — 99309 SBSQ NF CARE MODERATE MDM 30: CPT | Performed by: FAMILY MEDICINE

## 2020-05-12 RX ORDER — CHOLESTYRAMINE LIGHT 4 G/5.7G
POWDER, FOR SUSPENSION ORAL
COMMUNITY
Start: 2020-04-24

## 2020-05-12 RX ORDER — APIXABAN 2.5 MG/1
TABLET, FILM COATED ORAL
COMMUNITY
Start: 2020-05-04

## 2020-05-12 RX ORDER — CEFUROXIME AXETIL 250 MG/1
TABLET ORAL
COMMUNITY
Start: 2020-02-05

## 2020-05-12 RX ORDER — LORATADINE 10 MG/1
TABLET ORAL
COMMUNITY
Start: 2020-03-15

## 2020-05-12 RX ORDER — GUAIFENESIN AND DEXTROMETHORPHAN HYDROBROMIDE 100; 10 MG/5ML; MG/5ML
LIQUID ORAL
COMMUNITY
Start: 2020-03-26

## 2020-05-12 RX ORDER — MIRTAZAPINE 30 MG/1
TABLET, FILM COATED ORAL
COMMUNITY
Start: 2020-05-04

## 2020-05-12 RX ORDER — CEFUROXIME AXETIL 500 MG/1
TABLET ORAL
COMMUNITY
Start: 2020-02-05

## 2020-05-12 NOTE — PROGRESS NOTES
Nursing Home History and Physical Note      Ivan Klein MD  []  Hortencia Cristobal MD []  Leo Ruano MD []  Hernandez Booker MD [x]  Juliet Lees MD []  Clarke Stokes Sr, MD []   [address]  [address]  [place] [address]  Phone: [phone]  Fax: [phone]       PATIENT NAME: Vidhi Bertrand                                                                          YOB: 1940            DATE OF SERVICE: 05/12/2020    FACILITY:   [] [facility]    [] [facility]   [x] [facility]               [] [facility]    [] Other ______________________________________________________________________     Vidhi Bertrand is a 79 y.o. female.     CHIEF COMPLAINT:   Chief Complaint   Patient presents with   • nursing home visit         HISTORY OF PRESENT ILLNESS:    HPI    Leg pain.  She fell last week and landed on her side.  Her leg is painful and bruised.  She states she is having some pain in her low back.  She is improving, she has been able to stand and walk with therapy, no leg numbness or swelling    A. fib.  She recently was admitted at [facility] with low blood pressure following dialysis.  She was found to be in atrial fibrillation with a rapid ventricular response and treated with IV medication.  Cultures were negative for infection.  She is improved currently.  She has been able to walk without chest pain or shortness of breath.  She is not feeling any palpitations.  No dizziness or orthostasis.  Her blood pressure is improved.    Nursing home visit.  She is seen for follow-up nursing home visit today.  Overall she is feeling well, she is sleeping well, appetite is good.  She has had diarrhea for the last few months but this is currently improved.  Overall she just has a few minor complaints.    PAST MEDICAL HISTORY:   Patient Active Problem List   Diagnosis   • Paroxysmal atrial fibrillation (CMS/HCC)   • Anemia   • Anxiety   • Chronic kidney disease, stage IV  (severe) (CMS/HCC)   • Closed displaced fracture of right femoral neck (CMS/HCC)   • Fracture of neck of femur (CMS/HCC)   • Degeneration of intervertebral disc   • Visit for screening mammogram   • Anemia secondary to renal failure   • End-stage renal disease (CMS/HCC)   • Focal glomerulosclerosis   • Hand paresthesia   • Hay fever   • Hyperlipidemia   • Hypertension   • Insomnia, unspecified   • Memory loss   • Intracranial aneurysm   • Metabolic encephalopathy   • Mitral regurgitation   • Hypothyroidism   • Myxedema   • Osteoarthritis   • Osteopenia   • Overweight   • Pleural effusion, not elsewhere classified   • Postmenopausal status   • Secondary hyperparathyroidism (CMS/HCC)   • T3 low in serum   • Thyroid hormone resistance syndrome   • COVID-19 virus detected   • Sepsis (CMS/HCC)       SURGICAL HISTORY:  Past Surgical History:   Procedure Laterality Date   • CARDIAC CATHETERIZATION  09/2006    Negative, Stavens. Stavens 8/2009   • CARPAL TUNNEL RELEASE Left     LUE   • CEREBRAL ANEURYSM REPAIR  2003   • COLONOSCOPY  09/2007    Diverticulosis, and sm internal hemorrhoid, Saranya. Normal 2002   • REPLACEMENT TOTAL KNEE Right 2006    Left knee 06/05/2008 Dr. Hernandez   • TOTAL ABDOMINAL HYSTERECTOMY WITH SALPINGO OOPHORECTOMY         MEDICATIONS:  Current Outpatient Medications on File Prior to Visit   Medication Sig Dispense Refill   • acetaminophen (TYLENOL) 325 MG tablet      • ALLERGY RELIEF 10 MG tablet      • ALPRAZolam (XANAX) 0.5 MG tablet Take  by mouth.     • amiodarone (PACERONE) 200 MG tablet      • B Complex-C-Folic Acid (VIRT-CAPS) 1 MG capsule VIRT-CAPS 1 MG CAPS     • BANOPHEN 25 MG capsule      • bumetanide (BUMEX) 2 MG tablet      • ANTONI-GEST ANTACID 500 MG chewable tablet      • cefuroxime (CEFTIN) 250 MG tablet      • cefuroxime (CEFTIN) 500 MG tablet      • cholestyramine light 4 g packet      • diclofenac (VOLTAREN) 1 % gel gel      • dilTIAZem CD (CARTIA XT) 240 MG 24 hr capsule Take  by  mouth.     • ELIQUIS 2.5 MG tablet tablet      • epoetin luisa (PROCRIT) 43428 UNIT/ML injection PROCRIT SOLN     • escitalopram (LEXAPRO) 10 MG tablet      • ferrous sulfate 325 (65 FE) MG tablet Take  by mouth.     • folic acid (FOLVITE) 1 MG tablet      • hydrALAZINE (APRESOLINE) 50 MG tablet Take 50 mg by mouth.     • ipratropium-albuterol (DUO-NEB) 0.5-2.5 mg/3 ml nebulizer      • levothyroxine (SYNTHROID, LEVOTHROID) 150 MCG tablet      • melatonin 3 MG tablet      • midodrine (PROAMATINE) 10 MG tablet      • midodrine (PROAMATINE) 5 MG tablet      • mirtazapine (REMERON SOL-TAB) 30 MG disintegrating tablet      • mirtazapine (REMERON) 30 MG tablet      • Multiple Vitamin (MULTIVITAMIN) capsule Take  by mouth.     • nebivolol (BYSTOLIC) 10 MG tablet Take  by mouth.     • nebivolol (BYSTOLIC) 5 MG tablet BYSTOLIC 5 MG TABS     • pantoprazole (PROTONIX) 40 MG EC tablet      • Polyethylene Glycol 3350 (PEG 3350) powder      • potassium chloride (KLOR-CON) 20 MEQ packet Take  by mouth.     • promethazine (PHENERGAN) 25 MG tablet PROMETHAZINE HCL 25 MG TABS     • QUEtiapine (SEROquel) 25 MG tablet Take 12.5 mg by mouth.     • RENAGEL 800 MG tablet      • sertraline (ZOLOFT) 25 MG tablet Take  by mouth.     • sevelamer (RENVELA) 800 MG tablet      • SILTUSSIN DM ALCOHOL FREE 100-10 MG/5ML syrup      • simvastatin (ZOCOR) 10 MG tablet      • sucralfate (CARAFATE) 1 g tablet Take  by mouth.     • sulfamethoxazole-trimethoprim (BACTRIM DS,SEPTRA DS) 800-160 MG per tablet Take  by mouth.     • vitamin C (ASCORBIC ACID) 500 MG tablet Take  by mouth.     • Vitamins A & D (VITAMIN A & D) 8000-400 units capsule VITAMIN D 800 IU CAPSULE       No current facility-administered medications on file prior to visit.        I have reviewed and reconciled the patients medication list in the patients chart at the skilled nursing facility today.      ALLERGIES:  No Known Allergies      SOCIAL HISTORY:  Social History     Socioeconomic  History   • Marital status:      Spouse name: Not on file   • Number of children: Not on file   • Years of education: Not on file   • Highest education level: Not on file   Tobacco Use   • Smoking status: Never Smoker   • Smokeless tobacco: Never Used   Substance and Sexual Activity   • Alcohol use: Not Currently   • Drug use: Not Currently   • Sexual activity: Defer       FAMILY HISTORY:  Family History   Problem Relation Age of Onset   • Stomach cancer Mother    • Stomach cancer Father    • Kidney cancer Sister    • Stroke Brother    • Cerebral aneurysm Brother    • Kidney cancer Sister        REVIEW OF SYSTEMS:  Review of Systems   Constitutional: Negative for chills and diaphoresis.   Eyes: Negative for visual disturbance.   Respiratory: Negative for shortness of breath.    Cardiovascular: Negative for chest pain and palpitations.   Gastrointestinal: Negative for abdominal pain and nausea.   Endocrine: Negative for polydipsia and polyphagia.   Musculoskeletal: Negative for neck stiffness.   Skin: Negative for color change and pallor.   Neurological: Negative for seizures and syncope.   Hematological: Negative for adenopathy.         VITAL SIGNS: There were no vitals taken for this visit.    PHYSICAL EXAMINATION:   Physical Exam   Constitutional: She appears well-developed and well-nourished.   Skin: Skin is intact.       RECORDS REVIEW:   I have reviewed and interpreted the following lab test results  and ECG report  obtained at the time of the visit today.     Vidhi Bertrand consented to undergoing video visit today.  This format was necessitated by the current COVID-19 viral pandemic.      ASSESSMENT    Leg pain.  Status post ground-level fall.  Complaining of pain today but is improving, has been advancing activity and walking with therapy.  X-rays negative for fracture.  Consider further imaging if persistent symptoms.  Renal failure.  On intermittent dialysis.  Secondary to hypertension.  Followed by  [facility].  A. fib.  Status post recent admission to [facility] for A. fib with rapid ventricular response, responded to IV medication, clinically improved..  Improved today on amiodarone, tolerating Eliquis.  Risks versus benefit anticoagulation discussed.  Followed by Dr. Flowers, follow-up upcoming.  Abnormal chest x-ray.  Patchy infiltrates per inpatient chest x-ray, repeat.  hypothyroidism.  Clinically stable. tsh normal 11/19.  Hyperlipidemia.  Stable on statin.  ldl 52 on 11/19  Hip fracture.  Status post replacement.  Has done well with PT, consider restart.  Depression.  Doing well on Lexapro.  COVID-19 viral infection.  Overall doing well, positive sore throat, otherwise asymptomatic.    Completed course azithromycin.  Strict quarantine/isolation, monitor for worsening symptoms.  Diarrhea.    Improved/resolved..  Add PRN Colestid.  Hypertension.  Good control.  Allergic rhinitis.  Restart Claritin.  Cerebral aneurysm.  Remote history of surgery/repair with clips.    Problem List Items Addressed This Visit        Unprioritized    Paroxysmal atrial fibrillation (CMS/[facility]) - Primary    Chronic kidney disease, stage IV (severe) (CMS/HCC)    Hyperlipidemia    Relevant Medications    cholestyramine light 4 g packet    Hypertension    Hypothyroidism           PLAN  [x]  Discussed Patient in detail with nursing/staff, addressed all needs today.     [x]  Plan of Care Reviewed   [x]  PT/OT Reviewed   []  Order Changes  []  Discharge Plans Reviewed  []  Advance Directive on file with Nursing Home.   []  POA on file with Nursing Home.   []  Code Status listed: []  Full Code   []  DNR          Hernandez Booker MD

## 2020-05-13 ENCOUNTER — TELEPHONE (OUTPATIENT)
Dept: FAMILY MEDICINE CLINIC | Facility: CLINIC | Age: 80
End: 2020-05-13

## 2020-05-14 ENCOUNTER — TELEPHONE (OUTPATIENT)
Dept: FAMILY MEDICINE CLINIC | Facility: CLINIC | Age: 80
End: 2020-05-14

## 2020-05-18 ENCOUNTER — TELEPHONE (OUTPATIENT)
Dept: FAMILY MEDICINE CLINIC | Facility: CLINIC | Age: 80
End: 2020-05-18

## 2020-05-18 NOTE — TELEPHONE ENCOUNTER
Called [facility] and spoke to carolina. She said  goes to dialysis on Monday Wednesday and Friday.

## 2020-08-18 ENCOUNTER — TELEPHONE (OUTPATIENT)
Dept: FAMILY MEDICINE CLINIC | Facility: CLINIC | Age: 80
End: 2020-08-18

## 2020-08-18 NOTE — TELEPHONE ENCOUNTER
Patient called stating she is having a hard time sleeping and was wondering if there was anything we could send/ suggest to her. She is currently at [facility].

## 2020-08-20 ENCOUNTER — OUTSIDE FACILITY SERVICE (OUTPATIENT)
Dept: FAMILY MEDICINE CLINIC | Facility: CLINIC | Age: 80
End: 2020-08-20

## 2020-08-20 ENCOUNTER — NURSING HOME (OUTPATIENT)
Dept: FAMILY MEDICINE CLINIC | Facility: CLINIC | Age: 80
End: 2020-08-20

## 2020-08-20 DIAGNOSIS — N18.4 CHRONIC KIDNEY DISEASE, STAGE IV (SEVERE) (HCC): Primary | ICD-10-CM

## 2020-08-20 DIAGNOSIS — R41.3 MEMORY LOSS: ICD-10-CM

## 2020-08-20 DIAGNOSIS — E66.3 OVERWEIGHT: ICD-10-CM

## 2020-08-20 PROCEDURE — 99309 SBSQ NF CARE MODERATE MDM 30: CPT | Performed by: FAMILY MEDICINE

## 2020-08-20 PROCEDURE — OUTSIDEPOS PR OUTSIDE POS PLACEHOLDER: Performed by: FAMILY MEDICINE

## 2020-08-24 ENCOUNTER — TELEPHONE (OUTPATIENT)
Dept: FAMILY MEDICINE CLINIC | Facility: CLINIC | Age: 80
End: 2020-08-24

## 2020-08-24 NOTE — TELEPHONE ENCOUNTER
[facility] states patients left eye is red and swollen. Vidhi states her eye itches and is aggravating. Please advise. Telephone [phone] Fax [phone]

## 2020-08-25 VITALS
BODY MASS INDEX: 31.76 KG/M2 | OXYGEN SATURATION: 99 % | RESPIRATION RATE: 16 BRPM | TEMPERATURE: 97.6 F | HEART RATE: 89 BPM | DIASTOLIC BLOOD PRESSURE: 69 MMHG | WEIGHT: 179.3 LBS | SYSTOLIC BLOOD PRESSURE: 114 MMHG

## 2020-09-01 ENCOUNTER — TELEPHONE (OUTPATIENT)
Dept: FAMILY MEDICINE CLINIC | Facility: CLINIC | Age: 80
End: 2020-09-01

## 2020-09-01 NOTE — TELEPHONE ENCOUNTER
Hanh from [facility] called stating patients eye isnt getting any better and is also getting bigger. Vidhi is currently on Kelfex and Azithromycin. Vidhi is also complaining of being nauseated. Please advise [phone]

## 2020-09-01 NOTE — TELEPHONE ENCOUNTER
Lets have them get her scheduled to see ophthalmology, have them try to get her in in the next few days, thanks

## 2020-09-02 NOTE — TELEPHONE ENCOUNTER
Called and spoke to Ed as I was told the nurse who is taking care of Vidhi was in a room. I informed Ed that  would like Vidhi to see opthalmology in the next couple of days. She stated she would inform the nurse that is taking care of her.

## 2020-09-17 ENCOUNTER — TELEPHONE (OUTPATIENT)
Dept: FAMILY MEDICINE CLINIC | Facility: CLINIC | Age: 80
End: 2020-09-17

## 2020-10-08 ENCOUNTER — TELEPHONE (OUTPATIENT)
Dept: FAMILY MEDICINE CLINIC | Facility: CLINIC | Age: 80
End: 2020-10-08

## 2020-10-13 ENCOUNTER — TELEPHONE (OUTPATIENT)
Dept: FAMILY MEDICINE CLINIC | Facility: CLINIC | Age: 80
End: 2020-10-13

## 2020-10-13 PROBLEM — H04.552 OBSTRUCTION OF LEFT LACRIMAL DUCT: Status: ACTIVE | Noted: 2020-10-13

## 2020-10-13 PROBLEM — G56.03 BILATERAL CARPAL TUNNEL SYNDROME: Status: ACTIVE | Noted: 2020-10-13

## 2020-10-13 NOTE — TELEPHONE ENCOUNTER
There is an extra note in her encounter I cannot take it over get rid of it, please fix it , thanks

## 2020-11-04 ENCOUNTER — TELEPHONE (OUTPATIENT)
Dept: FAMILY MEDICINE CLINIC | Facility: CLINIC | Age: 80
End: 2020-11-04

## 2020-11-04 NOTE — TELEPHONE ENCOUNTER
Niecy   from [facility] called stating Vidhi requested the medical director at [facility] become her pcp. Please advise [phone]

## 2020-11-17 ENCOUNTER — TELEPHONE (OUTPATIENT)
Dept: FAMILY MEDICINE CLINIC | Facility: CLINIC | Age: 80
End: 2020-11-17

## 2021-04-07 ENCOUNTER — OFFICE (OUTPATIENT)
Dept: RURAL CLINIC 3 | Facility: CLINIC | Age: 81
End: 2021-04-07

## 2021-04-07 VITALS
SYSTOLIC BLOOD PRESSURE: 133 MMHG | HEART RATE: 106 BPM | DIASTOLIC BLOOD PRESSURE: 76 MMHG | HEIGHT: 63 IN | WEIGHT: 192 LBS

## 2021-04-07 DIAGNOSIS — K59.00 CONSTIPATION, UNSPECIFIED: ICD-10-CM

## 2021-04-07 DIAGNOSIS — K62.5 HEMORRHAGE OF ANUS AND RECTUM: ICD-10-CM

## 2021-04-07 DIAGNOSIS — D64.9 ANEMIA, UNSPECIFIED: ICD-10-CM

## 2021-04-07 PROCEDURE — 99213 OFFICE O/P EST LOW 20 MIN: CPT | Performed by: NURSE PRACTITIONER

## 2021-12-02 ENCOUNTER — HOSPITAL ENCOUNTER (OUTPATIENT)
Dept: GENERAL RADIOLOGY | Facility: HOSPITAL | Age: 81
Discharge: HOME | End: 2021-12-02

## 2021-12-02 DIAGNOSIS — R06.00 DYSPNEA, UNSPECIFIED: ICD-10-CM

## 2021-12-02 DIAGNOSIS — R07.9 CHEST PAIN, UNSPECIFIED: ICD-10-CM

## 2021-12-02 PROCEDURE — 71110 X-RAY EXAM RIBS BIL 3 VIEWS: CPT

## 2021-12-02 PROCEDURE — 71046 X-RAY EXAM CHEST 2 VIEWS: CPT

## 2023-07-10 PROBLEM — R29.810 FACIAL DROOP: Status: ACTIVE | Noted: 2023-07-10

## 2023-08-22 ENCOUNTER — OFFICE (OUTPATIENT)
Dept: URBAN - METROPOLITAN AREA CLINIC 64 | Facility: CLINIC | Age: 83
End: 2023-08-22
Payer: MEDICARE

## 2023-08-22 VITALS
WEIGHT: 171 LBS | HEART RATE: 89 BPM | HEIGHT: 63 IN | DIASTOLIC BLOOD PRESSURE: 68 MMHG | SYSTOLIC BLOOD PRESSURE: 129 MMHG

## 2023-08-22 DIAGNOSIS — R49.0 DYSPHONIA: ICD-10-CM

## 2023-08-22 DIAGNOSIS — R13.10 DYSPHAGIA, UNSPECIFIED: ICD-10-CM

## 2023-08-22 DIAGNOSIS — Z79.01 LONG TERM (CURRENT) USE OF ANTICOAGULANTS: ICD-10-CM

## 2023-08-22 DIAGNOSIS — K30 FUNCTIONAL DYSPEPSIA: ICD-10-CM

## 2023-08-22 PROCEDURE — 99214 OFFICE O/P EST MOD 30 MIN: CPT

## 2023-08-22 RX ORDER — FAMOTIDINE 40 MG/1
40 TABLET, FILM COATED ORAL
Qty: 30 | Refills: 5 | Status: ACTIVE
Start: 2023-08-22

## 2023-11-07 ENCOUNTER — ANESTHESIA (OUTPATIENT)
Dept: GASTROENTEROLOGY | Facility: HOSPITAL | Age: 83
End: 2023-11-07
Payer: MEDICARE

## 2023-11-07 ENCOUNTER — ANESTHESIA EVENT (OUTPATIENT)
Dept: GASTROENTEROLOGY | Facility: HOSPITAL | Age: 83
End: 2023-11-07
Payer: MEDICARE

## 2023-11-07 ENCOUNTER — ON CAMPUS - OUTPATIENT (OUTPATIENT)
Dept: URBAN - METROPOLITAN AREA HOSPITAL 85 | Facility: HOSPITAL | Age: 83
End: 2023-11-07
Payer: MEDICARE

## 2023-11-07 ENCOUNTER — HOSPITAL ENCOUNTER (OUTPATIENT)
Facility: HOSPITAL | Age: 83
Setting detail: HOSPITAL OUTPATIENT SURGERY
Discharge: HOME | End: 2023-11-07
Attending: INTERNAL MEDICINE | Admitting: INTERNAL MEDICINE
Payer: MEDICARE

## 2023-11-07 VITALS
OXYGEN SATURATION: 98 % | HEART RATE: 86 BPM | WEIGHT: 165 LBS | TEMPERATURE: 97.6 F | HEIGHT: 60 IN | BODY MASS INDEX: 32.39 KG/M2 | DIASTOLIC BLOOD PRESSURE: 49 MMHG | RESPIRATION RATE: 12 BRPM | SYSTOLIC BLOOD PRESSURE: 116 MMHG

## 2023-11-07 DIAGNOSIS — R49.0 FLACCID DYSPHONIA: ICD-10-CM

## 2023-11-07 DIAGNOSIS — R13.0 INABILITY TO SWALLOW: ICD-10-CM

## 2023-11-07 DIAGNOSIS — Z79.01 LONG TERM (CURRENT) USE OF ANTICOAGULANTS: ICD-10-CM

## 2023-11-07 DIAGNOSIS — K30 MILD DIETARY INDIGESTION: ICD-10-CM

## 2023-11-07 DIAGNOSIS — K29.70 GASTRITIS, UNSPECIFIED, WITHOUT BLEEDING: ICD-10-CM

## 2023-11-07 DIAGNOSIS — K44.9 DIAPHRAGMATIC HERNIA WITHOUT OBSTRUCTION OR GANGRENE: ICD-10-CM

## 2023-11-07 DIAGNOSIS — R13.10 DYSPHAGIA, UNSPECIFIED: ICD-10-CM

## 2023-11-07 PROCEDURE — 25810000003 SODIUM CHLORIDE 0.9 % SOLUTION: Performed by: INTERNAL MEDICINE

## 2023-11-07 PROCEDURE — 25010000002 PROPOFOL 200 MG/20ML EMULSION: Performed by: NURSE ANESTHETIST, CERTIFIED REGISTERED

## 2023-11-07 PROCEDURE — 88305 TISSUE EXAM BY PATHOLOGIST: CPT | Performed by: INTERNAL MEDICINE

## 2023-11-07 PROCEDURE — 25010000002 PHENYLEPHRINE 10 MG/ML SOLUTION: Performed by: NURSE ANESTHETIST, CERTIFIED REGISTERED

## 2023-11-07 PROCEDURE — 88312 SPECIAL STAINS GROUP 1: CPT | Performed by: INTERNAL MEDICINE

## 2023-11-07 PROCEDURE — 43239 EGD BIOPSY SINGLE/MULTIPLE: CPT | Performed by: INTERNAL MEDICINE

## 2023-11-07 PROCEDURE — 88342 IMHCHEM/IMCYTCHM 1ST ANTB: CPT | Performed by: INTERNAL MEDICINE

## 2023-11-07 PROCEDURE — 43450 DILATE ESOPHAGUS 1/MULT PASS: CPT | Performed by: INTERNAL MEDICINE

## 2023-11-07 RX ORDER — SODIUM CHLORIDE 9 MG/ML
50 INJECTION, SOLUTION INTRAVENOUS CONTINUOUS
Status: DISCONTINUED | OUTPATIENT
Start: 2023-11-07 | End: 2023-11-07 | Stop reason: HOSPADM

## 2023-11-07 RX ORDER — ONDANSETRON 2 MG/ML
4 INJECTION INTRAMUSCULAR; INTRAVENOUS ONCE AS NEEDED
Status: DISCONTINUED | OUTPATIENT
Start: 2023-11-07 | End: 2023-11-07 | Stop reason: HOSPADM

## 2023-11-07 RX ORDER — PHENYLEPHRINE HYDROCHLORIDE 10 MG/ML
INJECTION INTRAVENOUS AS NEEDED
Status: DISCONTINUED | OUTPATIENT
Start: 2023-11-07 | End: 2023-11-07 | Stop reason: SURG

## 2023-11-07 RX ORDER — LIDOCAINE HYDROCHLORIDE 20 MG/ML
INJECTION, SOLUTION EPIDURAL; INFILTRATION; INTRACAUDAL; PERINEURAL AS NEEDED
Status: DISCONTINUED | OUTPATIENT
Start: 2023-11-07 | End: 2023-11-07 | Stop reason: SURG

## 2023-11-07 RX ORDER — PROPOFOL 10 MG/ML
INJECTION, EMULSION INTRAVENOUS AS NEEDED
Status: DISCONTINUED | OUTPATIENT
Start: 2023-11-07 | End: 2023-11-07 | Stop reason: SURG

## 2023-11-07 RX ADMIN — PROPOFOL 70 MG: 10 INJECTION, EMULSION INTRAVENOUS at 11:21

## 2023-11-07 RX ADMIN — SODIUM CHLORIDE: 9 INJECTION, SOLUTION INTRAVENOUS at 11:16

## 2023-11-07 RX ADMIN — PHENYLEPHRINE HYDROCHLORIDE 100 MCG: 10 INJECTION INTRAVENOUS at 11:23

## 2023-11-07 RX ADMIN — PHENYLEPHRINE HYDROCHLORIDE 100 MCG: 10 INJECTION INTRAVENOUS at 11:30

## 2023-11-07 RX ADMIN — LIDOCAINE HYDROCHLORIDE 100 MG: 20 INJECTION, SOLUTION EPIDURAL; INFILTRATION; INTRACAUDAL; PERINEURAL at 11:21

## 2023-11-07 NOTE — ANESTHESIA POSTPROCEDURE EVALUATION
Patient: Vidhi Bertrand    Procedure Summary       Date: 11/07/23 Room / Location: [facility] ENDOSCOPY 4 / [facility] ENDOSCOPY    Anesthesia Start: 1116 Anesthesia Stop: 1138    Procedure: ESOPHAGOGASTRODUODENOSCOPY with gastric and esophageal biopsies, esophageal dilation #50 bougie Diagnosis:       Inability to swallow      Flaccid dysphonia      Mild dietary indigestion      Long term (current) use of anticoagulants      (Inability to swallow [R13.0])      (Flaccid dysphonia [R49.0])      (Mild dietary indigestion [K30])      (Long term (current) use of anticoagulants [Z79.01])    Surgeons: James Levy MD Provider: Aakash Zaman MD    Anesthesia Type: MAC ASA Status: 4            Anesthesia Type: MAC    Vitals  Vitals Value Taken Time   /51 11/07/23 1137   Temp     Pulse 84 11/07/23 1138   Resp     SpO2 100 % 11/07/23 1138   Vitals shown include unfiled device data.        Post Anesthesia Care and Evaluation    Patient location during evaluation: PACU  Patient participation: complete - patient participated  Level of consciousness: awake  Pain scale: See nurse's notes for pain score.  Pain management: adequate    Airway patency: patent  Anesthetic complications: No anesthetic complications  PONV Status: none  Cardiovascular status: acceptable  Respiratory status: acceptable and spontaneous ventilation  Hydration status: acceptable    Comments: Patient seen and examined postoperatively; vital signs stable; SpO2 greater than or equal to 90%; cardiopulmonary status stable; nausea/vomiting adequately controlled; pain adequately controlled; no apparent anesthesia complications; patient discharged from anesthesia care when discharge criteria were met

## 2023-11-07 NOTE — ANESTHESIA PREPROCEDURE EVALUATION
Anesthesia Evaluation     NPO Solid Status: > 8 hours  NPO Liquid Status: > 8 hours           Airway   Mallampati: II  TM distance: >3 FB  Neck ROM: full  No difficulty expected  Dental - normal exam     Pulmonary - normal exam   (+) ,shortness of breath  Cardiovascular - normal exam    (+) hypertension, valvular problems/murmurs, CAD, dysrhythmias Atrial Fib, hyperlipidemia      Neuro/Psych  (+) dizziness/light headedness, numbness, psychiatric history  GI/Hepatic/Renal/Endo    (+) renal disease- dialysis, thyroid problem hypothyroidism    Musculoskeletal     Abdominal  - normal exam    Bowel sounds: normal.   Substance History      OB/GYN          Other   arthritis,                 Anesthesia Plan    ASA 4     MAC   total IV anesthesia  intravenous induction     Anesthetic plan, risks, benefits, and alternatives have been provided, discussed and informed consent has been obtained with: patient.  Pre-procedure education provided  Plan discussed with CRNA.    CODE STATUS:

## 2023-11-07 NOTE — DISCHARGE INSTRUCTIONS
A responsible adult should stay with you and you should rest quietly for the rest of the day.    Do not drink alcohol, drive, operate any heavy machinery or power tools or make any legal/important decisions for the next 24 hours.     Progress your diet as tolerated.  If you begin to experience severe pain, increased shortness of breath, racing heartbeat or a fever above 101 F, seek immediate medical attention.     Follow up with MD as instructed. Call office for results in 3 to 5 days if needed. [phone]    Impression:  1.  A few white plaques in the midesophagus possibly consistent with Candida.  Cold forcep biopsies were taken to confir  2.  A small sliding hiatal hernia was present in the cardia that was less than 2 cm.  A 50 Irish nonguided bougie dilator was advanced blindly into the esophagus with minimal to no resistance no trauma upon relook.  3.  Significant erythema with erosions in the gastric body and antrum consistent with erosive gastritis, cold forcep biopsies were taken for H. pylori  4.  Normal duodenal mucosa visualized to D3     Recommendations:  Follow-up biopsy results and treat H. pylori if positive  PPI daily  Follow-up biopsy results and treat with nystatin and fluconazole if Candida esophagitis is confirmed  If everything is negative on biopsies and PPI does not improve dysphagia, may benefit from esophageal manometry
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
## 2023-11-07 NOTE — OP NOTE
ESOPHAGOGASTRODUODENOSCOPY Procedure Report    Patient Name:  Vidhi Bertrand  YOB: 1940    Date of Surgery:  11/7/2023     Preop diagnosis:  1.  Dysphagia    Postop diagnosis:  Abnormal esophageal mucosa  2.  Hiatal hernia  3.  Erosive gastritis    Procedure/CPT® Codes:      Procedure(s):  ESOPHAGOGASTRODUODENOSCOPY with biopsy plus snare    Staff:  Surgeon(s):  James Levy MD      Anesthesia: Monitored Anesthesia Care    Description of Procedure:  A description of the procedure as well as risks, benefits and alternative methods were explained to the patient who voiced understanding and signed the corresponding consent form. A physical exam was performed and vital signs were monitored throughout the procedure.    An upper GI endoscope was placed into the mouth and proceeded through the esophagus, stomach and second portion of the duodenum without difficulty. The scope was then retroflexed and the fundus was visualized. The procedure was not difficult and there were no immediate complications.  There was no blood loss.    Impression:  1.  A few white plaques in the midesophagus possibly consistent with Candida.  Cold forcep biopsies were taken to confir  2.  A small sliding hiatal hernia was present in the cardia that was less than 2 cm.  A 50 Maori nonguided bougie dilator was advanced blindly into the esophagus with minimal to no resistance no trauma upon relook.  3.  Significant erythema with erosions in the gastric body and antrum consistent with erosive gastritis, cold forcep biopsies were taken for H. pylori  4.  Normal duodenal mucosa visualized to D3    Recommendations:  Follow-up biopsy results and treat H. pylori if positive  PPI daily  Follow-up biopsy results and treat with nystatin and fluconazole if Candida esophagitis is confirmed  If everything is negative on biopsies and PPI does not improve dysphagia, may benefit from esophageal manometry      James Levy MD     Date:  11/7/2023    Time: 11:32 EST

## 2023-11-09 LAB
LAB AP CASE REPORT: NORMAL
PATH REPORT.FINAL DX SPEC: NORMAL
PATH REPORT.GROSS SPEC: NORMAL

## 2023-12-05 ENCOUNTER — OFFICE (OUTPATIENT)
Dept: URBAN - METROPOLITAN AREA CLINIC 64 | Facility: CLINIC | Age: 83
End: 2023-12-05
Payer: MEDICARE

## 2023-12-05 VITALS
HEIGHT: 63 IN | HEART RATE: 92 BPM | WEIGHT: 170 LBS | DIASTOLIC BLOOD PRESSURE: 67 MMHG | SYSTOLIC BLOOD PRESSURE: 118 MMHG

## 2023-12-05 DIAGNOSIS — B37.81 CANDIDAL ESOPHAGITIS: ICD-10-CM

## 2023-12-05 DIAGNOSIS — R13.10 DYSPHAGIA, UNSPECIFIED: ICD-10-CM

## 2023-12-05 PROCEDURE — 99213 OFFICE O/P EST LOW 20 MIN: CPT

## 2023-12-05 RX ORDER — FAMOTIDINE 40 MG/1
40 TABLET, FILM COATED ORAL
Qty: 90 | Refills: 3 | Status: ACTIVE
Start: 2023-12-05

## 2023-12-05 RX ORDER — PANTOPRAZOLE SODIUM 40 MG/1
40 TABLET, DELAYED RELEASE ORAL
Qty: 90 | Refills: 3 | Status: ACTIVE
Start: 2023-12-05

## 2025-01-03 ENCOUNTER — APPOINTMENT (OUTPATIENT)
Dept: CT IMAGING | Facility: HOSPITAL | Age: 85
End: 2025-01-03
Payer: MEDICARE

## 2025-01-03 ENCOUNTER — HOSPITAL ENCOUNTER (EMERGENCY)
Facility: HOSPITAL | Age: 85
Discharge: HOME | End: 2025-01-04
Attending: EMERGENCY MEDICINE
Payer: MEDICARE

## 2025-01-03 VITALS
OXYGEN SATURATION: 98 % | WEIGHT: 163.14 LBS | HEART RATE: 86 BPM | RESPIRATION RATE: 17 BRPM | BODY MASS INDEX: 32.03 KG/M2 | DIASTOLIC BLOOD PRESSURE: 69 MMHG | HEIGHT: 60 IN | TEMPERATURE: 98.6 F | SYSTOLIC BLOOD PRESSURE: 128 MMHG
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
## 2025-01-03 DIAGNOSIS — S00.83XA CONTUSION OF OTHER PART OF HEAD, INITIAL ENCOUNTER: ICD-10-CM

## 2025-01-03 DIAGNOSIS — S13.9XXA NECK SPRAIN, INITIAL ENCOUNTER: ICD-10-CM

## 2025-01-03 PROCEDURE — 72125 CT NECK SPINE W/O DYE: CPT

## 2025-01-03 PROCEDURE — 99284 EMERGENCY DEPT VISIT MOD MDM: CPT

## 2025-01-03 PROCEDURE — 70450 CT HEAD/BRAIN W/O DYE: CPT
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
## 2025-01-03 NOTE — ED NOTES
Pt is on the waiting list for EMS transport. EMS states it will be a few hours until they can  he pt

## 2025-01-03 NOTE — DISCHARGE INSTRUCTIONS
Return for altered mental status vomiting not answering questions appropriately passing out or any other new or worsening problems or concerns return immediately to the ER.  Keep wound clean with soap and water return if becomes red hot fevers drainage foul odor or any other new or worse problems or concerns.

## 2025-01-04 NOTE — ED NOTES
RN spoke to family on the phone, gave them an update that she was still on EMS list to go home. Family updated and RN confirmed [id number] number that she was still on the EMS list.   Instructions: This plan will send the code FBSE to the PM system.  DO NOT or CHANGE the price. Detail Level: Simple Price (Do Not Change): 0.00

## 2025-07-25 ENCOUNTER — HOSPITAL ENCOUNTER (OUTPATIENT)
Facility: HOSPITAL | Age: 85
Setting detail: OBSERVATION
Discharge: NURSING, REHAB OR LONG-TERM CARE | End: 2025-07-26
Attending: EMERGENCY MEDICINE | Admitting: EMERGENCY MEDICINE
Payer: MEDICARE

## 2025-07-25 DIAGNOSIS — M79.604 BILATERAL LEG PAIN: Primary | ICD-10-CM

## 2025-07-25 DIAGNOSIS — Z74.09 REDUCED MOBILITY: ICD-10-CM

## 2025-07-25 DIAGNOSIS — M79.605 BILATERAL LEG PAIN: Primary | ICD-10-CM

## 2025-07-25 DIAGNOSIS — R25.2 BILATERAL LEG CRAMPS: ICD-10-CM

## 2025-07-25 LAB
ALBUMIN SERPL-MCNC: 3.9 G/DL (ref 3.5–5.2)
ALBUMIN/GLOB SERPL: 1.8 G/DL
ALP SERPL-CCNC: 261 U/L (ref 39–117)
ALT SERPL W P-5'-P-CCNC: 14 U/L (ref 1–33)
ANION GAP SERPL CALCULATED.3IONS-SCNC: 12 MMOL/L (ref 5–15)
AST SERPL-CCNC: 25 U/L (ref 1–32)
BASOPHILS # BLD AUTO: 0.06 10*3/MM3 (ref 0–0.2)
BASOPHILS NFR BLD AUTO: 0.6 % (ref 0–1.5)
BILIRUB SERPL-MCNC: 0.6 MG/DL (ref 0–1.2)
BUN SERPL-MCNC: 33.8 MG/DL (ref 8–23)
BUN/CREAT SERPL: 11.7 (ref 7–25)
CALCIUM SPEC-SCNC: 8.7 MG/DL (ref 8.6–10.5)
CHLORIDE SERPL-SCNC: 89 MMOL/L (ref 98–107)
CO2 SERPL-SCNC: 28 MMOL/L (ref 22–29)
CREAT SERPL-MCNC: 2.88 MG/DL (ref 0.57–1)
DEPRECATED RDW RBC AUTO: 49.3 FL (ref 37–54)
EGFRCR SERPLBLD CKD-EPI 2021: 15.5 ML/MIN/1.73
EOSINOPHIL # BLD AUTO: 0.2 10*3/MM3 (ref 0–0.4)
EOSINOPHIL NFR BLD AUTO: 1.9 % (ref 0.3–6.2)
ERYTHROCYTE [DISTWIDTH] IN BLOOD BY AUTOMATED COUNT: 14.3 % (ref 12.3–15.4)
ERYTHROCYTE [SEDIMENTATION RATE] IN BLOOD: 25 MM/HR (ref 0–30)
GLOBULIN UR ELPH-MCNC: 2.2 GM/DL
GLUCOSE SERPL-MCNC: 107 MG/DL (ref 65–99)
HCT VFR BLD AUTO: 43.4 % (ref 34–46.6)
HGB BLD-MCNC: 13.7 G/DL (ref 12–15.9)
IMM GRANULOCYTES # BLD AUTO: 0.02 10*3/MM3 (ref 0–0.05)
IMM GRANULOCYTES NFR BLD AUTO: 0.2 % (ref 0–0.5)
LYMPHOCYTES # BLD AUTO: 0.91 10*3/MM3 (ref 0.7–3.1)
LYMPHOCYTES NFR BLD AUTO: 8.7 % (ref 19.6–45.3)
MCH RBC QN AUTO: 29.7 PG (ref 26.6–33)
MCHC RBC AUTO-ENTMCNC: 31.6 G/DL (ref 31.5–35.7)
MCV RBC AUTO: 94.1 FL (ref 79–97)
MONOCYTES # BLD AUTO: 1.16 10*3/MM3 (ref 0.1–0.9)
MONOCYTES NFR BLD AUTO: 11.1 % (ref 5–12)
NEUTROPHILS NFR BLD AUTO: 77.5 % (ref 42.7–76)
NEUTROPHILS NFR BLD AUTO: 8.13 10*3/MM3 (ref 1.7–7)
NRBC BLD AUTO-RTO: 0 /100 WBC (ref 0–0.2)
PLATELET # BLD AUTO: 178 10*3/MM3 (ref 140–450)
PMV BLD AUTO: 9.1 FL (ref 6–12)
POTASSIUM SERPL-SCNC: 3.8 MMOL/L (ref 3.5–5.2)
PROT SERPL-MCNC: 6.1 G/DL (ref 6–8.5)
RBC # BLD AUTO: 4.61 10*6/MM3 (ref 3.77–5.28)
SODIUM SERPL-SCNC: 129 MMOL/L (ref 136–145)
WBC NRBC COR # BLD AUTO: 10.48 10*3/MM3 (ref 3.4–10.8)

## 2025-07-25 PROCEDURE — 80053 COMPREHEN METABOLIC PANEL: CPT

## 2025-07-25 PROCEDURE — 25010000002 MORPHINE PER 10 MG

## 2025-07-25 PROCEDURE — 96375 TX/PRO/DX INJ NEW DRUG ADDON: CPT

## 2025-07-25 PROCEDURE — 87481 CANDIDA DNA AMP PROBE: CPT | Performed by: EMERGENCY MEDICINE

## 2025-07-25 PROCEDURE — 96374 THER/PROPH/DIAG INJ IV PUSH: CPT

## 2025-07-25 PROCEDURE — G0378 HOSPITAL OBSERVATION PER HR: HCPCS

## 2025-07-25 PROCEDURE — 85652 RBC SED RATE AUTOMATED: CPT

## 2025-07-25 PROCEDURE — 25010000002 ONDANSETRON PER 1 MG

## 2025-07-25 PROCEDURE — 99285 EMERGENCY DEPT VISIT HI MDM: CPT

## 2025-07-25 PROCEDURE — 85025 COMPLETE CBC W/AUTO DIFF WBC: CPT

## 2025-07-25 RX ORDER — SODIUM CHLORIDE 0.9 % (FLUSH) 0.9 %
10 SYRINGE (ML) INJECTION AS NEEDED
Status: DISCONTINUED | OUTPATIENT
Start: 2025-07-25 | End: 2025-07-26 | Stop reason: HOSPADM

## 2025-07-25 RX ORDER — SODIUM CHLORIDE 0.9 % (FLUSH) 0.9 %
10 SYRINGE (ML) INJECTION EVERY 12 HOURS SCHEDULED
Status: DISCONTINUED | OUTPATIENT
Start: 2025-07-25 | End: 2025-07-26 | Stop reason: HOSPADM

## 2025-07-25 RX ORDER — MORPHINE SULFATE 2 MG/ML
1 INJECTION, SOLUTION INTRAMUSCULAR; INTRAVENOUS
Status: DISPENSED | OUTPATIENT
Start: 2025-07-25 | End: 2025-07-26

## 2025-07-25 RX ORDER — HYDROXYZINE HYDROCHLORIDE 25 MG/1
25 TABLET, FILM COATED ORAL NIGHTLY
Status: COMPLETED | OUTPATIENT
Start: 2025-07-25 | End: 2025-07-25

## 2025-07-25 RX ORDER — AMOXICILLIN 250 MG
2 CAPSULE ORAL 2 TIMES DAILY PRN
Status: DISCONTINUED | OUTPATIENT
Start: 2025-07-25 | End: 2025-07-26 | Stop reason: HOSPADM

## 2025-07-25 RX ORDER — POLYETHYLENE GLYCOL 3350 17 G/17G
17 POWDER, FOR SOLUTION ORAL DAILY PRN
Status: DISCONTINUED | OUTPATIENT
Start: 2025-07-25 | End: 2025-07-26 | Stop reason: HOSPADM

## 2025-07-25 RX ORDER — MORPHINE SULFATE 2 MG/ML
1 INJECTION, SOLUTION INTRAMUSCULAR; INTRAVENOUS EVERY 4 HOURS PRN
Status: DISCONTINUED | OUTPATIENT
Start: 2025-07-25 | End: 2025-07-26 | Stop reason: HOSPADM

## 2025-07-25 RX ORDER — NALOXONE HCL 0.4 MG/ML
0.4 VIAL (ML) INJECTION
Status: DISCONTINUED | OUTPATIENT
Start: 2025-07-25 | End: 2025-07-26 | Stop reason: HOSPADM

## 2025-07-25 RX ORDER — IBUPROFEN 400 MG/1
200 TABLET, FILM COATED ORAL EVERY 4 HOURS PRN
Status: DISCONTINUED | OUTPATIENT
Start: 2025-07-25 | End: 2025-07-26 | Stop reason: HOSPADM

## 2025-07-25 RX ORDER — SODIUM CHLORIDE 9 MG/ML
40 INJECTION, SOLUTION INTRAVENOUS AS NEEDED
Status: DISCONTINUED | OUTPATIENT
Start: 2025-07-25 | End: 2025-07-26 | Stop reason: HOSPADM

## 2025-07-25 RX ORDER — BISACODYL 5 MG/1
5 TABLET, DELAYED RELEASE ORAL DAILY PRN
Status: DISCONTINUED | OUTPATIENT
Start: 2025-07-25 | End: 2025-07-26 | Stop reason: HOSPADM

## 2025-07-25 RX ORDER — ONDANSETRON 2 MG/ML
4 INJECTION INTRAMUSCULAR; INTRAVENOUS EVERY 6 HOURS PRN
Status: DISPENSED | OUTPATIENT
Start: 2025-07-25 | End: 2025-07-26

## 2025-07-25 RX ORDER — SEVELAMER CARBONATE 800 MG/1
800 TABLET, FILM COATED ORAL ONCE
Status: COMPLETED | OUTPATIENT
Start: 2025-07-25 | End: 2025-07-25

## 2025-07-25 RX ORDER — BISACODYL 10 MG
10 SUPPOSITORY, RECTAL RECTAL DAILY PRN
Status: DISCONTINUED | OUTPATIENT
Start: 2025-07-25 | End: 2025-07-26 | Stop reason: HOSPADM

## 2025-07-25 RX ADMIN — HYDROXYZINE HYDROCHLORIDE 25 MG: 25 TABLET, FILM COATED ORAL at 21:15

## 2025-07-25 RX ADMIN — ONDANSETRON 4 MG: 2 INJECTION, SOLUTION INTRAMUSCULAR; INTRAVENOUS at 17:27

## 2025-07-25 RX ADMIN — APIXABAN 2.5 MG: 2.5 TABLET, FILM COATED ORAL at 21:15

## 2025-07-25 RX ADMIN — MORPHINE SULFATE 1 MG: 2 INJECTION, SOLUTION INTRAMUSCULAR; INTRAVENOUS at 17:28

## 2025-07-25 RX ADMIN — IBUPROFEN 200 MG: 400 TABLET ORAL at 21:27

## 2025-07-25 RX ADMIN — SEVELAMER CARBONATE 800 MG: 800 TABLET, FILM COATED ORAL at 21:16

## 2025-07-25 RX ADMIN — Medication 10 ML: at 21:17
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
## 2025-07-25 NOTE — ED NOTES
Patient reports bilateral thigh pain. Pain worse with movement. Patient stated she received dialysis today. Patient is hard of hearing.

## 2025-07-25 NOTE — ED NOTES
Pt has had sharp pain and cramping in right thigh and pain in left hip that has gotten progressively worse. No known recent injury. Pt is from [facility]

## 2025-07-26 VITALS
TEMPERATURE: 97.8 F | HEIGHT: 60 IN | RESPIRATION RATE: 14 BRPM | OXYGEN SATURATION: 98 % | HEART RATE: 89 BPM | DIASTOLIC BLOOD PRESSURE: 61 MMHG | WEIGHT: 115.74 LBS | SYSTOLIC BLOOD PRESSURE: 129 MMHG | BODY MASS INDEX: 22.72 KG/M2

## 2025-07-26 LAB
ANION GAP SERPL CALCULATED.3IONS-SCNC: 14 MMOL/L (ref 5–15)
BUN SERPL-MCNC: 40.6 MG/DL (ref 8–23)
BUN/CREAT SERPL: 10.6 (ref 7–25)
CALCIUM SPEC-SCNC: 8.6 MG/DL (ref 8.6–10.5)
CHLORIDE SERPL-SCNC: 90 MMOL/L (ref 98–107)
CO2 SERPL-SCNC: 28 MMOL/L (ref 22–29)
CREAT SERPL-MCNC: 3.83 MG/DL (ref 0.57–1)
DEPRECATED RDW RBC AUTO: 49.5 FL (ref 37–54)
EGFRCR SERPLBLD CKD-EPI 2021: 11 ML/MIN/1.73
ERYTHROCYTE [DISTWIDTH] IN BLOOD BY AUTOMATED COUNT: 14.3 % (ref 12.3–15.4)
GLUCOSE SERPL-MCNC: 85 MG/DL (ref 65–99)
HCT VFR BLD AUTO: 40.2 % (ref 34–46.6)
HGB BLD-MCNC: 12.6 G/DL (ref 12–15.9)
MAGNESIUM SERPL-MCNC: 1.8 MG/DL (ref 1.6–2.4)
MCH RBC QN AUTO: 29.6 PG (ref 26.6–33)
MCHC RBC AUTO-ENTMCNC: 31.3 G/DL (ref 31.5–35.7)
MCV RBC AUTO: 94.6 FL (ref 79–97)
PLATELET # BLD AUTO: 170 10*3/MM3 (ref 140–450)
PMV BLD AUTO: 9.6 FL (ref 6–12)
POTASSIUM SERPL-SCNC: 3.7 MMOL/L (ref 3.5–5.2)
RBC # BLD AUTO: 4.25 10*6/MM3 (ref 3.77–5.28)
SODIUM SERPL-SCNC: 132 MMOL/L (ref 136–145)
WBC NRBC COR # BLD AUTO: 10.48 10*3/MM3 (ref 3.4–10.8)

## 2025-07-26 PROCEDURE — 85027 COMPLETE CBC AUTOMATED: CPT | Performed by: EMERGENCY MEDICINE

## 2025-07-26 PROCEDURE — G0378 HOSPITAL OBSERVATION PER HR: HCPCS

## 2025-07-26 PROCEDURE — 83735 ASSAY OF MAGNESIUM: CPT | Performed by: PHYSICIAN ASSISTANT

## 2025-07-26 PROCEDURE — 97161 PT EVAL LOW COMPLEX 20 MIN: CPT

## 2025-07-26 PROCEDURE — 80048 BASIC METABOLIC PNL TOTAL CA: CPT | Performed by: EMERGENCY MEDICINE

## 2025-07-26 RX ORDER — ACETAMINOPHEN 325 MG/1
650 TABLET ORAL EVERY 6 HOURS PRN
Status: DISCONTINUED | OUTPATIENT
Start: 2025-07-26 | End: 2025-07-26 | Stop reason: HOSPADM

## 2025-07-26 RX ORDER — LIDOCAINE 4 G/G
1 PATCH TOPICAL 2 TIMES DAILY
COMMUNITY

## 2025-07-26 RX ORDER — CALCIUM CARBONATE 500 MG/1
1 TABLET, CHEWABLE ORAL EVERY 6 HOURS PRN
Status: DISCONTINUED | OUTPATIENT
Start: 2025-07-26 | End: 2025-07-26

## 2025-07-26 RX ORDER — GUAIFENESIN 200 MG/10ML
200 LIQUID ORAL EVERY 6 HOURS PRN
COMMUNITY

## 2025-07-26 RX ORDER — SEVELAMER CARBONATE 800 MG/1
800 TABLET, FILM COATED ORAL
Status: DISCONTINUED | OUTPATIENT
Start: 2025-07-26 | End: 2025-07-26

## 2025-07-26 RX ORDER — POLYETHYLENE GLYCOL 3350 17 G/17G
17 POWDER, FOR SOLUTION ORAL DAILY
Status: DISCONTINUED | OUTPATIENT
Start: 2025-07-26 | End: 2025-07-26 | Stop reason: HOSPADM

## 2025-07-26 RX ORDER — HYPOCHLOROUS ACID/SODIUM CHLOR 0.01 %
1 SPRAY, NON-AEROSOL (ML) TOPICAL 2 TIMES DAILY
COMMUNITY

## 2025-07-26 RX ORDER — CYCLOSPORINE 0.5 MG/ML
1 EMULSION OPHTHALMIC 2 TIMES DAILY
COMMUNITY

## 2025-07-26 RX ORDER — CALCIUM CARBONATE/VITAMIN D3 600 MG-10
30 TABLET ORAL DAILY
COMMUNITY

## 2025-07-26 RX ORDER — BUSPIRONE HYDROCHLORIDE 5 MG/1
5 TABLET ORAL 2 TIMES DAILY
Status: DISCONTINUED | OUTPATIENT
Start: 2025-07-26 | End: 2025-07-26 | Stop reason: HOSPADM

## 2025-07-26 RX ORDER — CARBOXYMETHYLCELLULOSE SODIUM, GLYCERIN 5; 9 MG/ML; MG/ML
1 SOLUTION/ DROPS OPHTHALMIC 3 TIMES DAILY PRN
Status: ON HOLD | COMMUNITY
End: 2025-07-26

## 2025-07-26 RX ORDER — DULOXETIN HYDROCHLORIDE 60 MG/1
60 CAPSULE, DELAYED RELEASE ORAL DAILY
COMMUNITY

## 2025-07-26 RX ORDER — CALCIUM ACETATE 667 MG/1
667 CAPSULE ORAL 3 TIMES DAILY
Status: DISCONTINUED | OUTPATIENT
Start: 2025-07-26 | End: 2025-07-26 | Stop reason: HOSPADM

## 2025-07-26 RX ORDER — AMIODARONE HYDROCHLORIDE 200 MG/1
200 TABLET ORAL DAILY
Status: DISCONTINUED | OUTPATIENT
Start: 2025-07-26 | End: 2025-07-26 | Stop reason: HOSPADM

## 2025-07-26 RX ORDER — OLOPATADINE HYDROCHLORIDE 2 MG/ML
1 SOLUTION OPHTHALMIC DAILY
COMMUNITY

## 2025-07-26 RX ORDER — LEVOTHYROXINE SODIUM 175 UG/1
175 TABLET ORAL
Status: DISCONTINUED | OUTPATIENT
Start: 2025-07-26 | End: 2025-07-26 | Stop reason: HOSPADM

## 2025-07-26 RX ORDER — POLYETHYLENE GLYCOL 3350 17 G/17G
17 POWDER, FOR SOLUTION ORAL DAILY
COMMUNITY

## 2025-07-26 RX ORDER — CALCIUM ACETATE 667 MG/1
667 CAPSULE ORAL 3 TIMES DAILY
COMMUNITY

## 2025-07-26 RX ORDER — FAMOTIDINE 20 MG/1
10 TABLET, FILM COATED ORAL EVERY OTHER DAY
Status: DISCONTINUED | OUTPATIENT
Start: 2025-07-26 | End: 2025-07-26 | Stop reason: HOSPADM

## 2025-07-26 RX ORDER — BUSPIRONE HYDROCHLORIDE 5 MG/1
5 TABLET ORAL 2 TIMES DAILY
COMMUNITY

## 2025-07-26 RX ORDER — ACETAMINOPHEN 650 MG
1 TABLET, EXTENDED RELEASE ORAL 2 TIMES DAILY
COMMUNITY

## 2025-07-26 RX ORDER — LORATADINE 10 MG/1
10 TABLET ORAL EVERY OTHER DAY
COMMUNITY

## 2025-07-26 RX ORDER — HYDROXYZINE HYDROCHLORIDE 25 MG/1
25 TABLET, FILM COATED ORAL 2 TIMES DAILY PRN
Status: DISCONTINUED | OUTPATIENT
Start: 2025-07-26 | End: 2025-07-26

## 2025-07-26 RX ORDER — GUAIFENESIN 200 MG/10ML
100 LIQUID ORAL 4 TIMES DAILY PRN
Status: DISCONTINUED | OUTPATIENT
Start: 2025-07-26 | End: 2025-07-26 | Stop reason: HOSPADM

## 2025-07-26 RX ORDER — ACETAMINOPHEN 325 MG/1
650 TABLET ORAL EVERY 6 HOURS
COMMUNITY

## 2025-07-26 RX ORDER — FAMOTIDINE 10 MG
10 TABLET ORAL DAILY
COMMUNITY

## 2025-07-26 RX ORDER — WHEAT DEXTRIN 3 G/4 G
1 POWDER IN PACKET (EA) ORAL DAILY
COMMUNITY

## 2025-07-26 RX ORDER — CICLOPIROX 80 MG/ML
1 SOLUTION TOPICAL NIGHTLY
COMMUNITY

## 2025-07-26 RX ADMIN — APIXABAN 2.5 MG: 2.5 TABLET, FILM COATED ORAL at 13:06

## 2025-07-26 RX ADMIN — Medication 10 ML: at 09:54

## 2025-07-26 RX ADMIN — CALCIUM ACETATE 667 MG: 667 CAPSULE ORAL at 13:06

## 2025-07-26 RX ADMIN — POLYETHYLENE GLYCOL 3350 17 G: 17 POWDER, FOR SOLUTION ORAL at 11:28

## 2025-07-26 RX ADMIN — FAMOTIDINE 10 MG: 20 TABLET, FILM COATED ORAL at 11:27

## 2025-07-26 RX ADMIN — LEVOTHYROXINE SODIUM 175 MCG: 175 TABLET ORAL at 11:27

## 2025-07-26 RX ADMIN — BUSPIRONE HYDROCHLORIDE 5 MG: 5 TABLET ORAL at 13:06

## 2025-07-26 RX ADMIN — AMIODARONE HYDROCHLORIDE 200 MG: 200 TABLET ORAL at 13:06
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
## 2025-07-26 NOTE — PLAN OF CARE
Problem: Adult Inpatient Plan of Care  Goal: Plan of Care Review  Outcome: Progressing  Flowsheets  Taken 7/26/2025 1331 by Ilene Szymanski, RN  Progress: improving  Plan of Care Reviewed With:   patient   child  Taken 7/26/2025 1129 by Florinda Nolen, PT  Outcome Evaluation: Pt is 83 yo female admitted for reduced mobility and leg pain/cramps.  Pt reports no falls x3 months.  PMH: HD, HTN, HLD, CKD.  PLOF difficult to determine with communication barrier of confusion and severe hearing impairment.  However pt reports she does not ambulate and utilizes w/c for mobility.  Unable to determine level of assist pt requires for ADLs and transfers.  Pt reports she has been residing at [facility] x3 years.  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  On this date, pt requiring maxA for bed mobility and maxA for sit <-> stand transfer at EOB with BUE support.  Unable to weightshift to transfer to chair.  Pt reporting some discomfort at times during movement of BLEs, however BLE ROM testing WFL.  Pt appears close to functional mobility baseline.  Plan return to [facility] with therapy as needed.  PT will follow 3x/week while at [facility].  Goal: Patient-Specific Goal (Individualized)  Outcome: Progressing  Goal: Absence of Hospital-Acquired Illness or Injury  Outcome: Progressing  Intervention: Identify and Manage Fall Risk  Recent Flowsheet Documentation  Taken 7/26/2025 1200 by Ilene Szymanski, RN  Safety Promotion/Fall Prevention:   safety round/check completed   room organization consistent  Taken 7/26/2025 1000 by Ilene Szymanski, RN  Safety Promotion/Fall Prevention:   room organization consistent   safety round/check completed  Taken 7/26/2025 0800 by Ilene Szymanski, RN  Safety Promotion/Fall Prevention:   safety round/check completed   room organization consistent  Taken 7/26/2025 0100 by Ilene Szymanski, RN  Safety Promotion/Fall Prevention:   safety round/check completed   room organization consistent  Intervention: Prevent  Skin Injury  Recent Flowsheet Documentation  Taken 7/26/2025 1200 by Ilene Szymanski RN  Body Position:   turned   left  Taken 7/26/2025 0800 by Ilene Szymanski RN  Body Position:   side-lying   right  Intervention: Prevent and Manage VTE (Venous Thromboembolism) Risk  Recent Flowsheet Documentation  Taken 7/26/2025 0800 by Ilene Szymanski RN  VTE Prevention/Management: SCDs (sequential compression devices) off  Goal: Optimal Comfort and Wellbeing  Outcome: Progressing  Goal: Readiness for Transition of Care  Outcome: Progressing     Problem: Comorbidity Management  Goal: Blood Pressure in Desired Range  Outcome: Progressing     Problem: Skin Injury Risk Increased  Goal: Skin Health and Integrity  Outcome: Progressing  Intervention: Optimize Skin Protection  Recent Flowsheet Documentation  Taken 7/26/2025 0800 by Ilene Szymanski RN  Activity Management: activity encouraged     Problem: Pain Acute  Goal: Optimal Pain Control and Function  Outcome: Progressing   Goal Outcome Evaluation:  Plan of Care Reviewed With: patient, child        Progress: improving

## 2025-07-26 NOTE — PLAN OF CARE
Goal Outcome Evaluation:  Pt is 83 yo female admitted for reduced mobility and leg pain/cramps. Pt reports no falls x3 months. PMH: HD, HTN, HLD, CKD. PLOF difficult to determine with communication barrier of confusion and severe hearing impairment. However pt reports she does not ambulate and utilizes w/c for mobility. Unable to determine level of assist pt requires for ADLs and transfers. Pt reports she has been residing at [facility] x3 years. 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 On this date, pt requiring maxA for bed mobility and maxA for sit <-> stand transfer at EOB with BUE support. Unable to weightshift to transfer to chair. Pt reporting some discomfort at times during movement of BLEs, however BLE ROM testing WFL. Pt appears close to functional mobility baseline. Plan return to [facility] with therapy as needed. PT will follow 3x/week while at [facility].

## 2025-07-26 NOTE — PLAN OF CARE
Problem: Adult Inpatient Plan of Care  Goal: Plan of Care Review  7/26/2025 1609 by Ilene Szymanski RN  Outcome: Met  Flowsheets  Taken 7/26/2025 1609  Outcome Evaluation: pt discharged back to nursing home. report given to DAVID Frankel  Taken 7/26/2025 1331  Plan of Care Reviewed With:   patient   child  7/26/2025 1331 by Ilene Szymanski RN  Outcome: Progressing  Flowsheets  Taken 7/26/2025 1331 by Ilene Szymanski RN  Progress: improving  Plan of Care Reviewed With:   patient   child  Taken 7/26/2025 1129 by Florinda Nolen, PT  Outcome Evaluation: Pt is 83 yo female admitted for reduced mobility and leg pain/cramps.  Pt reports no falls x3 months.  PMH: HD, HTN, HLD, CKD.  PLOF difficult to determine with communication barrier of confusion and severe hearing impairment.  However pt reports she does not ambulate and utilizes w/c for mobility.  Unable to determine level of assist pt requires for ADLs and transfers.  Pt reports she has been residing at [facility] x3 years.  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  On this date, pt requiring maxA for bed mobility and maxA for sit <-> stand transfer at EOB with BUE support.  Unable to weightshift to transfer to chair.  Pt reporting some discomfort at times during movement of BLEs, however BLE ROM testing WFL.  Pt appears close to functional mobility baseline.  Plan return to [facility] with therapy as needed.  PT will follow 3x/week while at [facility].  Goal: Patient-Specific Goal (Individualized)  7/26/2025 1609 by Ilene Szymanski RN  Outcome: Met  7/26/2025 1331 by Ilene Szymanski RN  Outcome: Progressing  Goal: Absence of Hospital-Acquired Illness or Injury  7/26/2025 1609 by Ilene Szymanski, RN  Outcome: Met  7/26/2025 1331 by Ilene Szymanski, RN  Outcome: Progressing  Intervention: Identify and Manage Fall Risk  Recent Flowsheet Documentation  Taken 7/26/2025 1200 by Ilene Szymanski, RN  Safety Promotion/Fall Prevention:   safety round/check completed   room organization  consistent  Taken 7/26/2025 1000 by Ilene Szymanski RN  Safety Promotion/Fall Prevention:   room organization consistent   safety round/check completed  Taken 7/26/2025 0800 by Ilene Szymanski RN  Safety Promotion/Fall Prevention:   safety round/check completed   room organization consistent  Intervention: Prevent Skin Injury  Recent Flowsheet Documentation  Taken 7/26/2025 1200 by Ilene Szymanski RN  Body Position:   turned   left  Taken 7/26/2025 0800 by Ilene Szymanski RN  Body Position:   side-lying   right  Intervention: Prevent and Manage VTE (Venous Thromboembolism) Risk  Recent Flowsheet Documentation  Taken 7/26/2025 0800 by Ilene Szymanski RN  VTE Prevention/Management: SCDs (sequential compression devices) off  Goal: Optimal Comfort and Wellbeing  7/26/2025 1609 by Ilene Szymanski RN  Outcome: Met  7/26/2025 1331 by Ilene Szymanski RN  Outcome: Progressing  Goal: Readiness for Transition of Care  7/26/2025 1609 by Ilene Szymanski RN  Outcome: Met  7/26/2025 1331 by Ilene Szymanski RN  Outcome: Progressing     Problem: Comorbidity Management  Goal: Blood Pressure in Desired Range  7/26/2025 1609 by Ilene Szymanski RN  Outcome: Met  7/26/2025 1331 by Ilene Szymanski RN  Outcome: Progressing     Problem: Skin Injury Risk Increased  Goal: Skin Health and Integrity  7/26/2025 1609 by Ilene Szymanski RN  Outcome: Met  7/26/2025 1331 by Ilene Szymanski RN  Outcome: Progressing  Intervention: Optimize Skin Protection  Recent Flowsheet Documentation  Taken 7/26/2025 0800 by Ilene Szymanski RN  Activity Management: activity encouraged     Problem: Pain Acute  Goal: Optimal Pain Control and Function  7/26/2025 1609 by Ilene Szymanski RN  Outcome: Met  7/26/2025 1331 by Ilene Szymanski RN  Outcome: Progressing   Goal Outcome Evaluation:  Plan of Care Reviewed With: patient, child        Progress: improving  Outcome Evaluation: pt discharged back to nursing home. report given to DAVID Frankel

## 2025-07-26 NOTE — CASE MANAGEMENT/SOCIAL WORK
Continued Stay Note  SILVANA Ayala     Patient Name: Vidhi Bertrand  MRN: [id number]  Today's Date: 7/26/2025    Admit Date: 7/25/2025        Discharge Plan       Row Name 07/26/25 1601       Plan    Plan Comments Verified with liaison, pt can return to [facility]. EMS recommended and arranged via Epic.                      Expected Discharge Date and Time       Expected Discharge Date Expected Discharge Time    Jul 26, 2025               Vanessa York RN

## 2025-07-26 NOTE — PLAN OF CARE
Problem: Pain Acute  Goal: Optimal Pain Control and Function  Outcome: Progressing  Intervention: Prevent or Manage Pain  Recent Flowsheet Documentation  Taken 7/26/2025 0200 by Gauri River, RN  Medication Review/Management: medications reviewed  Taken 7/26/2025 0026 by Gauri River, RN  Medication Review/Management: medications reviewed  Taken 7/25/2025 2200 by Gauri River, RN  Medication Review/Management: medications reviewed  Taken 7/25/2025 2015 by Gauri River, RN  Medication Review/Management: medications reviewed   Goal Outcome Evaluation:   Bilateral leg pain from thigh down

## 2025-07-26 NOTE — PROGRESS NOTES
Vidhi Bertrand is a 84 y.o. female admitted with bilateral leg cramps.     Recent Labs     07/25/25  1635 07/26/25  0147   CREATININE 2.88* 3.83*   BUN 33.8* 40.6*   * 132*   K 3.8 3.7   CL 89* 90*   CO2 28.0 28.0     Estimated Creatinine Clearance: 8.9 mL/min (A) (by C-G formula based on SCr of 3.83 mg/dL (H)).    Assessment/Plan  Famotidine renally adjusted to 10 mg QOD per the Adult Renal Dosing of Medication policy for estCrCl < 30 mL/min    Tory Dawson RPH  7/26/2025

## 2025-07-26 NOTE — DISCHARGE SUMMARY
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    REPLACEMENT TOTAL KNEE Right 2006    Left knee 06/05/2008 Dr. Hernandez    TOTAL ABDOMINAL HYSTERECTOMY WITH SALPINGO OOPHORECTOMY         Social History:   Social History     Socioeconomic History    Marital status:    Tobacco Use    Smoking status: Never    Smokeless tobacco: Never   Vaping Use    Vaping status: Never Used   Substance and Sexual Activity    Alcohol use: Not Currently    Drug use: Not Currently    Sexual activity: Defer       Procedures Performed         Consults:   Consults       No orders found for last 30 day(s).            Condition on Discharge:     Stable    Discharge Disposition  Long Term Care (DC - External)    Discharge Medications     Discharge Medications        Continue These Medications        Instructions Start Date   acetaminophen 325 MG tablet  Commonly known as: TYLENOL   650 mg, Every 6 Hours PRN      acetaminophen 325 MG tablet  Commonly known as: TYLENOL   650 mg, Every 6 Hours      amiodarone 200 MG tablet  Commonly known as: PACERONE   200 mg, Oral, Daily      Aquaphor Adv Protect Healing 41 % ointment   Every 4 Hours PRN      Avenova 0.01 % solution   1 spray, 2 Times Daily      OCUSOFT EYELID CLEANSING EX   1 Pad, 2 Times Daily      B complex-C-folic acid 1 MG capsule capsule   1 capsule, Daily       Biofreeze 4 % gel  Generic drug: Menthol (Topical Analgesic)   4 Times Daily      BIOTENE ORALBALANCE DRY MOUTH MT   1 Application, Daily PRN      busPIRone 5 MG tablet  Commonly known as: BUSPAR   5 mg, 2 Times Daily      calcium acetate 667 MG capsule capsule  Commonly known as: PHOS BINDER)   667 mg, 3 Times Daily      carboxymethylcellulose 0.5 % solution  Commonly known as: REFRESH PLUS   1 drop, 4 Times Daily      ciclopirox 8 % solution  Commonly known as: PENLAC   1 Application, Nightly      Claritin 10 MG tablet  Generic drug: loratadine   10 mg, Every Other Day      cycloSPORINE 0.05 % ophthalmic emulsion  Commonly known as: RESTASIS   1 drop, 2 Times Daily      Diclofenac Sodium 1 % gel gel  Commonly known as: VOLTAREN   4 g, 4 Times Daily      DULoxetine 60 MG capsule  Commonly known as: CYMBALTA   60 mg, Daily      Eliquis 2.5 MG tablet tablet  Generic drug: apixaban   2.5 mg, Every 12 Hours Scheduled      famotidine 10 MG tablet  Commonly known as: PEPCID   10 mg, Daily      guaifenesin 100 MG/5ML liquid  Commonly known as: ROBITUSSIN   200 mg, Every 6 Hours PRN      levothyroxine 175 MCG tablet  Commonly known as: SYNTHROID, LEVOTHROID   175 mcg, Every Early Morning      Lidocaine 4 %   1 patch, 2 Times Daily      Sherine's magic butt cream   4 Times Daily      Sherine's magic butt cream   1 Application, 2 Times Daily PRN      melatonin 3 MG tablet   3 mg, Nightly      midodrine 5 MG tablet  Commonly known as: PROAMATINE   5 mg, 3 Times Weekly      midodrine 10 MG tablet  Commonly known as: PROAMATINE   10 mg, 3 Times Weekly      MiraLax 17 g packet  Generic drug: polyethylene glycol   17 g, Daily      olopatadine 0.2 % solution ophthalmic solution  Commonly known as: PATADAY   1 drop, Daily      ondansetron 4 MG tablet  Commonly known as: ZOFRAN   4 mg, Oral, Every 6 Hours PRN      pantoprazole 40 MG EC tablet  Commonly known as: PROTONIX   40 mg, Daily      povidone-iodine 10 % external solution  10%  Commonly known as: BETADINE   1 Application, 2 Times Daily      SIMVASTATIN PO   5 mg, Daily      wheat dextrin powder powder   1 each, Daily      Zinc Gluconate 30 MG tablet   30 mg, Daily               Discharge Diet:     Activity at Discharge:     Follow-up Appointments  No future appointments.  Additional Instructions for the Follow-ups that You Need to Schedule       Discharge Follow-up with PCP   As directed       Currently Documented PCP:    Sampson Pride MD    PCP Phone Number:    [phone]     Follow Up Details: 5 to 7 days        Discharge Follow-up with Specified Provider: Nephrology; 1 Week   As directed      To: Nephrology   Follow Up: 1 Week                Test Results Pending at Discharge  Pending Results       Procedure [Order ID] Specimen - Date/Time    CANDIDA AURIS PCR - Swab, Axilla Right, Axilla Left and Groin [[id number]] Collected: 07/25/25 2126    Specimen: Swab from Axilla Right, Axilla Left and Groin Updated: 07/25/25 2144             Risk for Readmission (LACE) Score: 4 (7/26/2025  6:00 AM)      Greater than 30 minutes spent in discharge activities for this patient    Signature:Electronically signed by Marshal Cain PA-C, 07/26/25, 3:29 PM EDT.
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
## 2025-07-26 NOTE — NURSING NOTE
Report given to DAVID Frankel at [facility]. Son is aware that his mom will be going back tonight. EMS ETA is 2030.

## 2025-07-26 NOTE — DISCHARGE PLACEMENT REQUEST
"Keely Bertrand (84 y.o. Female)       Date of Birth   1940    Social Security Number       Address   [address] [place] [address] [place] [facility]    Home Phone   [phone]    MRN   [id number]       Judaism   Confucianist    Marital Status                               Admission Date   7/25/2025    Admission Type   Emergency    Admitting Provider   Reuben Argueta MD    Attending Provider   Reuben Argueta MD    Department, Room/Bed   [facility] OBSERVATION, 106/1       Discharge Date       Discharge Disposition   Long Term Care (DC - External)    Discharge Destination                                 Attending Provider: Reuben Argueta MD    Allergies: Acyclovir, Gabapentin, Pedi-pre Tape Spray [Wound Dressing Adhesive]    Isolation: Contact   Infection: Candida Auris (rule out) (07/25/25)   Code Status: No CPR    Ht: 152.4 cm (60\")   Wt: 52.5 kg (115 lb 11.9 oz)    Admission Cmt: None   Principal Problem: Bilateral leg cramps [R25.2]                   Active Insurance as of 7/25/2025       Primary Coverage       Payor Plan Insurance Group Employer/Plan Group    [employer] MEDICARE REPLACEMENT [employer] PPO [id number]       Payor Plan Address Payor Plan Phone Number Payor Plan Fax Number Effective Dates    [address] [address] [phone]  1/1/2024 - None Entered    [facility] [id number]         Subscriber Name Subscriber Birth Date Member ID       KEELY BERTRAND 1940 [id number]               Secondary Coverage       Payor Plan Insurance Group Employer/Plan Group    [employer] [place] [id number]       Payor Plan Address Payor Plan Phone Number Payor Plan Fax Number Effective Dates    [address] [address]   10/1/2024 - None Entered    [facility] [id number]         Subscriber Name Subscriber Birth Date Member ID       KEELY BERTRAND 1940 [id number]                     Emergency Contacts        (Rel.) Home Phone Work Phone Mobile Phone    " Kenny Bertrand (Son) -- -- [phone]

## 2025-07-28 LAB — C AURIS DNA SPEC QL NAA+NON-PROBE: NOT DETECTED

## 2025-07-28 NOTE — CASE MANAGEMENT/SOCIAL WORK
Case Management Discharge Note      Final Note: [facility]         Selected Continued Care - Discharged on 7/26/2025 Admission date: 7/25/2025 - Discharge disposition: Long Term Care (DC - External)      Destination Coordination complete.      Service Provider Services Address Phone Fax Patient Preferred    [facility] [address] [facility] [place] [address] [phone] --             Transportation Services  Transportation: Ambulance  Ambulance: [facility]  [facility] Ambulance Status: Accepted    Final Discharge Disposition Code: 03 - skilled nursing facility (SNF)      Jeannette Light RN    Phone [phone]  Fax [phone]

## (undated) DEVICE — PK ENDO GI 50

## (undated) DEVICE — BITEBLOCK ENDO W/STRAP 60F A/ LF DISP